# Patient Record
Sex: FEMALE | Employment: FULL TIME | URBAN - METROPOLITAN AREA
[De-identification: names, ages, dates, MRNs, and addresses within clinical notes are randomized per-mention and may not be internally consistent; named-entity substitution may affect disease eponyms.]

---

## 2017-06-08 ENCOUNTER — HOSPITAL ENCOUNTER (OUTPATIENT)
Dept: MAMMOGRAPHY | Age: 77
Discharge: HOME | End: 2017-06-08
Attending: SURGERY | Admitting: SURGERY

## 2017-06-08 DIAGNOSIS — Z12.31 VISIT FOR SCREENING MAMMOGRAM: ICD-10-CM

## 2018-06-14 ENCOUNTER — HOSPITAL ENCOUNTER (OUTPATIENT)
Dept: MAMMOGRAPHY | Age: 78
Discharge: HOME | End: 2018-06-14
Attending: SURGERY | Admitting: SURGERY

## 2018-06-14 DIAGNOSIS — Z12.31 VISIT FOR SCREENING MAMMOGRAM: ICD-10-CM

## 2019-06-20 ENCOUNTER — HOSPITAL ENCOUNTER (OUTPATIENT)
Dept: MAMMOGRAPHY | Age: 79
Discharge: HOME | End: 2019-06-20
Payer: MEDICARE

## 2019-06-20 DIAGNOSIS — Z12.31 VISIT FOR SCREENING MAMMOGRAM: ICD-10-CM

## 2019-06-20 PROCEDURE — 77067 SCR MAMMO BI INCL CAD: CPT

## 2020-08-13 ENCOUNTER — HOSPITAL ENCOUNTER (OUTPATIENT)
Dept: MAMMOGRAPHY | Age: 80
Discharge: HOME | End: 2020-08-13
Payer: MEDICARE

## 2020-08-13 PROCEDURE — 77067 SCR MAMMO BI INCL CAD: CPT

## 2023-03-09 ENCOUNTER — HOSPITAL ENCOUNTER (OUTPATIENT)
Dept: ULTRASOUND IMAGING | Age: 83
Discharge: HOME | End: 2023-03-09
Payer: MEDICARE

## 2023-03-09 ENCOUNTER — HOSPITAL ENCOUNTER (OUTPATIENT)
Dept: MAMMOGRAPHY | Age: 83
Discharge: HOME | End: 2023-03-09
Payer: MEDICARE

## 2023-03-09 VITALS — BODY MASS INDEX: 2.94 KG/M2 | HEIGHT: 58 IN | WEIGHT: 14 LBS

## 2023-03-09 DIAGNOSIS — C50.011 PAGET'S DISEASE OF THE BREAST, RIGHT (HCC): ICD-10-CM

## 2023-03-09 DIAGNOSIS — N61.0 INFLAMMATORY DISEASE OF BREAST: ICD-10-CM

## 2023-03-09 DIAGNOSIS — N63.10 MASS OF RIGHT BREAST, UNSPECIFIED QUADRANT: ICD-10-CM

## 2023-03-09 PROCEDURE — 77066 DX MAMMO INCL CAD BI: CPT

## 2023-03-09 PROCEDURE — 76642 ULTRASOUND BREAST LIMITED: CPT

## 2024-04-25 ENCOUNTER — APPOINTMENT (OUTPATIENT)
Age: 84
DRG: 308 | End: 2024-04-25
Payer: MEDICARE

## 2024-04-25 ENCOUNTER — HOSPITAL ENCOUNTER (INPATIENT)
Age: 84
LOS: 5 days | Discharge: NURSING, REHAB OR LONG-TERM CARE | DRG: 308 | End: 2024-04-30
Attending: EMERGENCY MEDICINE | Admitting: INTERNAL MEDICINE
Payer: MEDICARE

## 2024-04-25 DIAGNOSIS — R74.8 ELEVATED CK: ICD-10-CM

## 2024-04-25 DIAGNOSIS — R29.6 FREQUENT FALLS: ICD-10-CM

## 2024-04-25 DIAGNOSIS — R79.89 ELEVATED TROPONIN: ICD-10-CM

## 2024-04-25 DIAGNOSIS — I48.91 ATRIAL FIBRILLATION, UNSPECIFIED TYPE (HCC): ICD-10-CM

## 2024-04-25 DIAGNOSIS — N39.0 ACUTE LOWER UTI: ICD-10-CM

## 2024-04-25 DIAGNOSIS — R53.1 GENERAL WEAKNESS: Primary | ICD-10-CM
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
## 2024-04-25 LAB
ALBUMIN SERPL-MCNC: 3.6 G/DL (ref 3.4–5)
ALBUMIN/GLOB SERPL: 1.2 {RATIO}
ALP SERPL-CCNC: 82 U/L (ref 40–129)
ALT SERPL-CCNC: 6 U/L (ref 10–40)
ANION GAP SERPL CALCULATED.3IONS-SCNC: 14 MMOL/L (ref 3–16)
AST SERPL-CCNC: 43 U/L (ref 15–37)
BACTERIA URNS QL MICRO: ABNORMAL
BASOPHILS # BLD: 0.01 K/UL (ref 0–0.2)
BASOPHILS NFR BLD: 0 %
BILIRUB SERPL-MCNC: 1 MG/DL (ref 0–1)
BILIRUB UR QL STRIP: ABNORMAL
BUN SERPL-MCNC: 24 MG/DL (ref 7–20)
CALCIUM SERPL-MCNC: 9.9 MG/DL (ref 8.3–10.6)
CHARACTER UR: ABNORMAL
CHLORIDE SERPL-SCNC: 99 MMOL/L (ref 99–110)
CK SERPL-CCNC: 374 U/L (ref 26–192)
CLARITY UR: ABNORMAL
CO2 SERPL-SCNC: 27 MMOL/L (ref 21–32)
COLOR UR: YELLOW
CREAT SERPL-MCNC: 0.9 MG/DL (ref 0.6–1.2)
EOSINOPHIL # BLD: 0.02 K/UL (ref 0–0.6)
EOSINOPHILS RELATIVE PERCENT: 0 %
EPI CELLS #/AREA URNS HPF: ABNORMAL /HPF
ERYTHROCYTE [DISTWIDTH] IN BLOOD BY AUTOMATED COUNT: 15 % (ref 12.4–15.4)
FLUAV AG SPEC QL: NEGATIVE
FLUBV AG SPEC QL: NEGATIVE
GFR SERPL CREATININE-BSD FRML MDRD: 61 ML/MIN/1.73M2
GLUCOSE SERPL-MCNC: 84 MG/DL (ref 70–99)
GLUCOSE UR STRIP-MCNC: NEGATIVE MG/DL
HCT VFR BLD AUTO: 48.3 % (ref 36–48)
HGB BLD-MCNC: 15.6 G/DL (ref 12–16)
HGB UR QL STRIP.AUTO: ABNORMAL
IMM GRANULOCYTES # BLD AUTO: 0.08 K/UL (ref 0–0.5)
IMM GRANULOCYTES NFR BLD: 1 %
KETONES UR STRIP-MCNC: 15 MG/DL
LEUKOCYTE ESTERASE UR QL STRIP: ABNORMAL
LYMPHOCYTES NFR BLD: 0.71 K/UL (ref 1–5.1)
LYMPHOCYTES RELATIVE PERCENT: 5 %
MCH RBC QN AUTO: 30.8 PG (ref 26–34)
MCHC RBC AUTO-ENTMCNC: 32.3 G/DL (ref 31–36)
MCV RBC AUTO: 95.3 FL (ref 80–100)
MONOCYTES NFR BLD: 0.75 K/UL (ref 0–1.3)
MONOCYTES NFR BLD: 6 %
NEUTROPHILS NFR BLD: 89 %
NEUTS SEG NFR BLD: 12.06 K/UL (ref 1.7–7.7)
NITRITE UR QL STRIP: NEGATIVE
PH UR STRIP: 6 [PH] (ref 5–8)
PLATELET # BLD AUTO: 170 K/UL (ref 135–450)
PMV BLD AUTO: 11.5 FL (ref 9.4–12.4)
POTASSIUM SERPL-SCNC: 3.6 MMOL/L (ref 3.5–5.1)
PROT SERPL-MCNC: 6.6 G/DL (ref 6.4–8.2)
PROT UR STRIP-MCNC: 30 MG/DL
RBC # BLD AUTO: 5.07 M/UL (ref 4–5.2)
RBC #/AREA URNS HPF: ABNORMAL /HPF
SARS-COV-2 RDRP RESP QL NAA+PROBE: NOT DETECTED
SODIUM SERPL-SCNC: 139 MMOL/L (ref 136–145)
SP GR UR STRIP: 1.02 (ref 1–1.03)
SPECIMEN DESCRIPTION: NORMAL
TROPONIN I SERPL HS-MCNC: 34 NG/L (ref 0–14)
TROPONIN I SERPL HS-MCNC: 36 NG/L (ref 0–14)
UROBILINOGEN UR STRIP-ACNC: 2 EU/DL (ref 0–1)
WBC #/AREA URNS HPF: ABNORMAL /HPF
WBC OTHER # BLD: 13.6 K/UL (ref 4–11)

## 2024-04-25 PROCEDURE — 99285 EMERGENCY DEPT VISIT HI MDM: CPT

## 2024-04-25 PROCEDURE — 2580000003 HC RX 258: Performed by: INTERNAL MEDICINE

## 2024-04-25 PROCEDURE — 84484 ASSAY OF TROPONIN QUANT: CPT

## 2024-04-25 PROCEDURE — 93005 ELECTROCARDIOGRAM TRACING: CPT

## 2024-04-25 PROCEDURE — 85025 COMPLETE CBC W/AUTO DIFF WBC: CPT

## 2024-04-25 PROCEDURE — 2580000003 HC RX 258: Performed by: EMERGENCY MEDICINE

## 2024-04-25 PROCEDURE — 73502 X-RAY EXAM HIP UNI 2-3 VIEWS: CPT

## 2024-04-25 PROCEDURE — 87635 SARS-COV-2 COVID-19 AMP PRB: CPT

## 2024-04-25 PROCEDURE — 1200000000 HC SEMI PRIVATE

## 2024-04-25 PROCEDURE — 71045 X-RAY EXAM CHEST 1 VIEW: CPT

## 2024-04-25 PROCEDURE — 6370000000 HC RX 637 (ALT 250 FOR IP): Performed by: INTERNAL MEDICINE

## 2024-04-25 PROCEDURE — 87804 INFLUENZA ASSAY W/OPTIC: CPT

## 2024-04-25 PROCEDURE — 81001 URINALYSIS AUTO W/SCOPE: CPT

## 2024-04-25 PROCEDURE — 80053 COMPREHEN METABOLIC PANEL: CPT

## 2024-04-25 PROCEDURE — 96360 HYDRATION IV INFUSION INIT: CPT

## 2024-04-25 PROCEDURE — 70450 CT HEAD/BRAIN W/O DYE: CPT

## 2024-04-25 PROCEDURE — 82550 ASSAY OF CK (CPK): CPT

## 2024-04-25 PROCEDURE — 87086 URINE CULTURE/COLONY COUNT: CPT

## 2024-04-25 PROCEDURE — 94761 N-INVAS EAR/PLS OXIMETRY MLT: CPT

## 2024-04-25 PROCEDURE — 6360000002 HC RX W HCPCS: Performed by: EMERGENCY MEDICINE

## 2024-04-25 PROCEDURE — 36415 COLL VENOUS BLD VENIPUNCTURE: CPT

## 2024-04-25 PROCEDURE — 2700000000 HC OXYGEN THERAPY PER DAY

## 2024-04-25 RX ORDER — PROPRANOLOL HCL 60 MG
120 CAPSULE, EXTENDED RELEASE 24HR ORAL DAILY
Status: DISCONTINUED | OUTPATIENT
Start: 2024-04-26 | End: 2024-04-29

## 2024-04-25 RX ORDER — HEPARIN SODIUM 1000 [USP'U]/ML
30 INJECTION, SOLUTION INTRAVENOUS; SUBCUTANEOUS PRN
Status: DISCONTINUED | OUTPATIENT
Start: 2024-04-25 | End: 2024-04-25

## 2024-04-25 RX ORDER — ACETAMINOPHEN 650 MG/1
650 SUPPOSITORY RECTAL EVERY 6 HOURS PRN
Status: DISCONTINUED | OUTPATIENT
Start: 2024-04-25 | End: 2024-04-30 | Stop reason: HOSPADM

## 2024-04-25 RX ORDER — POTASSIUM CHLORIDE 20 MEQ/1
40 TABLET, EXTENDED RELEASE ORAL PRN
Status: DISCONTINUED | OUTPATIENT
Start: 2024-04-25 | End: 2024-04-30 | Stop reason: HOSPADM

## 2024-04-25 RX ORDER — SODIUM CHLORIDE 0.9 % (FLUSH) 0.9 %
5-40 SYRINGE (ML) INJECTION PRN
Status: DISCONTINUED | OUTPATIENT
Start: 2024-04-25 | End: 2024-04-27 | Stop reason: SDUPTHER

## 2024-04-25 RX ORDER — MAGNESIUM SULFATE IN WATER 40 MG/ML
2000 INJECTION, SOLUTION INTRAVENOUS PRN
Status: DISCONTINUED | OUTPATIENT
Start: 2024-04-25 | End: 2024-04-30 | Stop reason: HOSPADM

## 2024-04-25 RX ORDER — ASPIRIN 81 MG/1
81 TABLET ORAL DAILY
Status: DISCONTINUED | OUTPATIENT
Start: 2024-04-26 | End: 2024-04-30 | Stop reason: HOSPADM

## 2024-04-25 RX ORDER — POLYETHYLENE GLYCOL 3350 17 G/17G
17 POWDER, FOR SOLUTION ORAL DAILY PRN
Status: DISCONTINUED | OUTPATIENT
Start: 2024-04-25 | End: 2024-04-30 | Stop reason: HOSPADM

## 2024-04-25 RX ORDER — 0.9 % SODIUM CHLORIDE 0.9 %
500 INTRAVENOUS SOLUTION INTRAVENOUS ONCE
Status: COMPLETED | OUTPATIENT
Start: 2024-04-25 | End: 2024-04-25

## 2024-04-25 RX ORDER — HEPARIN SODIUM 1000 [USP'U]/ML
60 INJECTION, SOLUTION INTRAVENOUS; SUBCUTANEOUS PRN
Status: DISCONTINUED | OUTPATIENT
Start: 2024-04-25 | End: 2024-04-25

## 2024-04-25 RX ORDER — ACETAMINOPHEN 325 MG/1
650 TABLET ORAL EVERY 6 HOURS PRN
Status: DISCONTINUED | OUTPATIENT
Start: 2024-04-25 | End: 2024-04-30 | Stop reason: HOSPADM

## 2024-04-25 RX ORDER — ONDANSETRON 2 MG/ML
4 INJECTION INTRAMUSCULAR; INTRAVENOUS EVERY 6 HOURS PRN
Status: DISCONTINUED | OUTPATIENT
Start: 2024-04-25 | End: 2024-04-30 | Stop reason: HOSPADM

## 2024-04-25 RX ORDER — SODIUM CHLORIDE 0.9 % (FLUSH) 0.9 %
5-40 SYRINGE (ML) INJECTION EVERY 12 HOURS SCHEDULED
Status: DISCONTINUED | OUTPATIENT
Start: 2024-04-25 | End: 2024-04-27 | Stop reason: SDUPTHER

## 2024-04-25 RX ORDER — HEPARIN SODIUM 1000 [USP'U]/ML
60 INJECTION, SOLUTION INTRAVENOUS; SUBCUTANEOUS ONCE
Status: DISCONTINUED | OUTPATIENT
Start: 2024-04-25 | End: 2024-04-25

## 2024-04-25 RX ORDER — ONDANSETRON 4 MG/1
4 TABLET, ORALLY DISINTEGRATING ORAL EVERY 8 HOURS PRN
Status: DISCONTINUED | OUTPATIENT
Start: 2024-04-25 | End: 2024-04-30 | Stop reason: HOSPADM

## 2024-04-25 RX ORDER — HEPARIN SODIUM 10000 [USP'U]/100ML
5-30 INJECTION, SOLUTION INTRAVENOUS CONTINUOUS
Status: DISCONTINUED | OUTPATIENT
Start: 2024-04-25 | End: 2024-04-25

## 2024-04-25 RX ORDER — PANTOPRAZOLE SODIUM 40 MG/1
40 TABLET, DELAYED RELEASE ORAL
Status: DISCONTINUED | OUTPATIENT
Start: 2024-04-26 | End: 2024-04-30 | Stop reason: HOSPADM

## 2024-04-25 RX ORDER — SODIUM CHLORIDE 9 MG/ML
INJECTION, SOLUTION INTRAVENOUS PRN
Status: DISCONTINUED | OUTPATIENT
Start: 2024-04-25 | End: 2024-04-30 | Stop reason: HOSPADM

## 2024-04-25 RX ORDER — SODIUM CHLORIDE 9 MG/ML
INJECTION, SOLUTION INTRAVENOUS CONTINUOUS
Status: DISCONTINUED | OUTPATIENT
Start: 2024-04-25 | End: 2024-04-26

## 2024-04-25 RX ORDER — LOSARTAN POTASSIUM 25 MG/1
50 TABLET ORAL DAILY
Status: DISCONTINUED | OUTPATIENT
Start: 2024-04-26 | End: 2024-04-29

## 2024-04-25 RX ORDER — ATORVASTATIN CALCIUM 10 MG/1
40 TABLET, FILM COATED ORAL NIGHTLY
Status: DISCONTINUED | OUTPATIENT
Start: 2024-04-25 | End: 2024-04-30 | Stop reason: HOSPADM

## 2024-04-25 RX ORDER — POTASSIUM CHLORIDE 7.45 MG/ML
10 INJECTION INTRAVENOUS PRN
Status: DISCONTINUED | OUTPATIENT
Start: 2024-04-25 | End: 2024-04-30 | Stop reason: HOSPADM

## 2024-04-25 RX ADMIN — SODIUM CHLORIDE 500 ML: 9 INJECTION, SOLUTION INTRAVENOUS at 16:31

## 2024-04-25 RX ADMIN — WATER 1000 MG: 1 INJECTION INTRAMUSCULAR; INTRAVENOUS; SUBCUTANEOUS at 17:55

## 2024-04-25 RX ADMIN — Medication 10 ML: at 20:14

## 2024-04-25 RX ADMIN — SODIUM CHLORIDE: 9 INJECTION, SOLUTION INTRAVENOUS at 20:13

## 2024-04-25 RX ADMIN — ATORVASTATIN CALCIUM 40 MG: 40 TABLET, FILM COATED ORAL at 20:09

## 2024-04-25 ASSESSMENT — LIFESTYLE VARIABLES
HOW MANY STANDARD DRINKS CONTAINING ALCOHOL DO YOU HAVE ON A TYPICAL DAY: PATIENT DOES NOT DRINK
HOW OFTEN DO YOU HAVE A DRINK CONTAINING ALCOHOL: NEVER

## 2024-04-25 ASSESSMENT — PAIN - FUNCTIONAL ASSESSMENT: PAIN_FUNCTIONAL_ASSESSMENT: 0-10

## 2024-04-25 NOTE — ACP (ADVANCE CARE PLANNING)
Advance Care Planning     Advance Care Planning Activator (Inpatient)  Conversation Note      Date of ACP Conversation: 4/25/2024   Conversation Conducted with: Patient with Decision Making Capacity  ACP Activator: Neil Rome MD    Hospital diagnoses warranting ACP:  Principal Problem:    Fall at home, initial encounter  Resolved Problems:    * No resolved hospital problems. *        Health Care Decision Maker:   Current Designated Health Care Decision Maker: Kiran Daley (Son)        Care Preferences    Ventilation:  \"If you were in your present state of health and suddenly became very ill and were unable to breathe on your own, what would your preference be about the use of a ventilator (breathing machine) if it were available to you?\"      Would the patient desire the use of ventilator (breathing machine)?: no    \"If your health worsens and it becomes clear that your chance of recovery is unlikely, what would your preference be about the use of a ventilator (breathing machine) if it were available to you?\"     Would the patient desire the use of ventilator (breathing machine)?: No      Resuscitation  \"CPR works best to restart the heart when there is a sudden event, like a heart attack, in someone who is otherwise healthy. Unfortunately, CPR does not typically restart the heart for people who have serious health conditions or who are very sick.\"    \"In the event your heart stopped as a result of an underlying serious health condition, would you want attempts to be made to restart your heart (answer \"yes\" for attempt to resuscitate) or would you prefer a natural death (answer \"no\" for do not attempt to resuscitate)?\" no       [x] Yes   [] No   Educated Patient / Decision Maker regarding differences between Advance Directives and portable DNR orders.        Conversation Outcomes:  [x] ACP discussion completed  [] Existing advance directive reviewed with patient; no changes to patient's previously recorded

## 2024-04-25 NOTE — H&P
CCA  Healthcare power of -Vidal Daley (SON)    Diet Regular 2 g sodium   DVT Prophylaxis [] Lovenox, [x]  Heparin, [] SCDs, [] Ambulation   GI Prophylaxis [] PPI,  [] H2 Blocker,  [] Carafate,  [] Diet/Tube Feeds   Code Status DNR-CCA   Disposition Patient requires continued admission due to UTI/atrial fibrillation/cardiology workup   MDM [] Low, [] Moderate,[x]  High  Patient's risk as above      History of Present Illness:     Chief Complaint: Fall at home, initial encounter  Radha Daley is a 83 y.o.  female with past medical history of hypertension, hyperlipidemia, came to the emergency room complaining of generalized weakness.  Patient apparently was not able to get up from the toilet seat and gradually slid herself down and was unable to get up from the floor.  Patient apparently laid on the floor for about 4 hours.  Denies any dizziness or loss of consciousness.  Reports episodes of falls recently.  Reports right hip pain.  Denies any fever, chills, cough, shortness of breath, chest pain, abdominal pain, nausea, vomiting, diarrhea or constipation.  Patient reports that she has become progressively weak in recent days.    Objective:     Intake/Output Summary (Last 24 hours) at 4/25/2024 1755  Last data filed at 4/25/2024 1702  Gross per 24 hour   Intake 499.98 ml   Output --   Net 499.98 ml      Vitals:   Vitals:    04/25/24 1659   BP: 126/89   Pulse: 72   Resp: (!) 33   Temp:    SpO2: 98%     Physical Exam:     General-no acute distress  CVS-S1-S2 RRR  Respiratory-bilateral air entry fair  Abdomen-soft, nontender, nondistended  CNS-AOx3, no focal deficits    Past Medical History:      Past Medical History:   Diagnosis Date    Breast cancer (HCC)     History of therapeutic radiation      PSHX:  has no past surgical history on file.    Allergies: No Known Allergies    FAM HX: family history includes Breast Cancer in her mother, niece, and sister.  Soc HX:   Social History     Socioeconomic

## 2024-04-25 NOTE — ED TRIAGE NOTES
Patient presents to ED with concern for weakness. Reportedly was on the toilet, was unable to get up, slid herself to the floor to attempt to get assistance. Lives at home alone, denies any complaint or injury.

## 2024-04-25 NOTE — ED NOTES
Daughter updated on pt status.  Sister and brother in law at bedside  
EKG performed & given to MD for review.   
Hospitalist at [facility]  
Pt assisted to bedside commode with 2 nurses. Pt posture hunched over. Pt appears to not be taken care of hygiene wise. Assisted back to bed with heavy assistance and repositioned. SPO2 dropped to mid 80's with 2L on.   
Pt repositioned in bed and pulse ox changed, SPO2 remain in 80's. 2L NC applied  
Pt to CT per bed  
X-ray at bedside.   
  Peripheral IV 04/25/24 Left Antecubital (Active)   Site Assessment Clean, dry & intact 04/25/24 1625   Line Status Flushed 04/25/24 1625   Phlebitis Assessment No symptoms 04/25/24 1625   Infiltration Assessment 0 04/25/24 1625   Dressing Status New dressing applied 04/25/24 1625   Dressing Type Transparent 04/25/24 1625   Dressing Intervention New 04/25/24 1625     PO Status: Regular  Pertinent or High Risk Medications/Drips: no   If Yes, please provide details:     Pending Blood Product Administration: no       You may also review the ED PT Care Timeline found under the Summary Nursing Index tab.    Recommendation    Pending orders     Plan for Discharge (if known):   Additional Comments:      If any further questions, please call Sending RN at [phone]    Electronically signed by: Electronically signed by Nisreen Wood RN on 4/25/2024 at 6:15 PM

## 2024-04-25 NOTE — ED PROVIDER NOTES
personally seen and examined this patient. I have fully participated in the care of this patient and I have reviewed and agree with all pertinent clinical information including history, physical exam, and plan. I have also reviewed and agree with the medications, allergies and past medical history section for this patient.    Electronically signed by: Yuri Lombardo Jr., , JANIS, [facility], 4/25/2024  5:47 PM        Yuri Lombardo DO  04/25/24 9322

## 2024-04-26 ENCOUNTER — APPOINTMENT (OUTPATIENT)
Age: 84
DRG: 308 | End: 2024-04-26
Payer: MEDICARE

## 2024-04-26 PROBLEM — E44.0 MODERATE MALNUTRITION (HCC): Chronic | Status: ACTIVE | Noted: 2024-04-26

## 2024-04-26 PROBLEM — I27.20 PULMONARY HYPERTENSION (HCC): Status: ACTIVE | Noted: 2024-04-26

## 2024-04-26 LAB
ANION GAP SERPL CALCULATED.3IONS-SCNC: 11 MMOL/L (ref 3–16)
ANTI-XA UNFRAC HEPARIN: <0.1 IU/L (ref 0.3–0.7)
BASOPHILS # BLD: 0.01 K/UL (ref 0–0.2)
BASOPHILS NFR BLD: 0 %
BUN SERPL-MCNC: 25 MG/DL (ref 7–20)
CALCIUM SERPL-MCNC: 8.4 MG/DL (ref 8.3–10.6)
CHLORIDE SERPL-SCNC: 103 MMOL/L (ref 99–110)
CK SERPL-CCNC: 295 U/L (ref 26–192)
CK SERPL-CCNC: 323 U/L (ref 26–192)
CO2 SERPL-SCNC: 27 MMOL/L (ref 21–32)
CREAT SERPL-MCNC: 0.9 MG/DL (ref 0.6–1.2)
D DIMER PPP FEU-MCNC: 3.24 UG/ML FEU (ref 0–0.6)
EKG DIAGNOSIS: NORMAL
EKG Q-T INTERVAL: 432 MS
EKG QRS DURATION: 74 MS
EKG QTC CALCULATION (BAZETT): 456 MS
EKG R AXIS: -68 DEGREES
EKG T AXIS: 180 DEGREES
EKG VENTRICULAR RATE: 67 BPM
EOSINOPHIL # BLD: 0.02 K/UL (ref 0–0.6)
EOSINOPHILS RELATIVE PERCENT: 0 %
ERYTHROCYTE [DISTWIDTH] IN BLOOD BY AUTOMATED COUNT: 15 % (ref 12.4–15.4)
GFR SERPL CREATININE-BSD FRML MDRD: 68 ML/MIN/1.73M2
GLUCOSE SERPL-MCNC: 79 MG/DL (ref 70–99)
HCT VFR BLD AUTO: 42.5 % (ref 36–48)
HGB BLD-MCNC: 13.7 G/DL (ref 12–16)
IMM GRANULOCYTES # BLD AUTO: 0.03 K/UL (ref 0–0.5)
IMM GRANULOCYTES NFR BLD: 0 %
INR PPP: 1.1 (ref 0.9–1.2)
LACTATE BLDV-SCNC: 3.6 MMOL/L (ref 0.4–2)
LYMPHOCYTES NFR BLD: 0.58 K/UL (ref 1–5.1)
LYMPHOCYTES RELATIVE PERCENT: 5 %
MAGNESIUM SERPL-MCNC: 1.9 MG/DL (ref 1.8–2.4)
MCH RBC QN AUTO: 31 PG (ref 26–34)
MCHC RBC AUTO-ENTMCNC: 32.2 G/DL (ref 31–36)
MCV RBC AUTO: 96.2 FL (ref 80–100)
MICROORGANISM SPEC CULT: NORMAL
MONOCYTES NFR BLD: 0.54 K/UL (ref 0–1.3)
MONOCYTES NFR BLD: 5 %
NEUTROPHILS NFR BLD: 90 %
NEUTS SEG NFR BLD: 10.38 K/UL (ref 1.7–7.7)
PARTIAL THROMBOPLASTIN TIME: 25.8 SEC (ref 22.1–36.4)
PLATELET # BLD AUTO: 156 K/UL (ref 135–450)
PMV BLD AUTO: 10.9 FL
POTASSIUM SERPL-SCNC: 3.5 MMOL/L (ref 3.5–5.1)
PROTHROMBIN TIME: 14.7 SEC (ref 11.9–14.9)
RBC # BLD AUTO: 4.42 M/UL (ref 4–5.2)
SODIUM SERPL-SCNC: 141 MMOL/L (ref 136–145)
SPECIMEN DESCRIPTION: NORMAL
TROPONIN I SERPL HS-MCNC: 44 NG/L (ref 0–14)
WBC OTHER # BLD: 11.6 K/UL (ref 4–11)

## 2024-04-26 PROCEDURE — 83735 ASSAY OF MAGNESIUM: CPT

## 2024-04-26 PROCEDURE — 94640 AIRWAY INHALATION TREATMENT: CPT

## 2024-04-26 PROCEDURE — 2700000000 HC OXYGEN THERAPY PER DAY

## 2024-04-26 PROCEDURE — 97110 THERAPEUTIC EXERCISES: CPT

## 2024-04-26 PROCEDURE — 97530 THERAPEUTIC ACTIVITIES: CPT

## 2024-04-26 PROCEDURE — 82550 ASSAY OF CK (CPK): CPT

## 2024-04-26 PROCEDURE — 97166 OT EVAL MOD COMPLEX 45 MIN: CPT

## 2024-04-26 PROCEDURE — 71260 CT THORAX DX C+: CPT

## 2024-04-26 PROCEDURE — 84484 ASSAY OF TROPONIN QUANT: CPT

## 2024-04-26 PROCEDURE — 80048 BASIC METABOLIC PNL TOTAL CA: CPT

## 2024-04-26 PROCEDURE — 6360000004 HC RX CONTRAST MEDICATION: Performed by: INTERNAL MEDICINE

## 2024-04-26 PROCEDURE — 93306 TTE W/DOPPLER COMPLETE: CPT

## 2024-04-26 PROCEDURE — 85730 THROMBOPLASTIN TIME PARTIAL: CPT

## 2024-04-26 PROCEDURE — 85379 FIBRIN DEGRADATION QUANT: CPT

## 2024-04-26 PROCEDURE — 6360000002 HC RX W HCPCS: Performed by: INTERNAL MEDICINE

## 2024-04-26 PROCEDURE — 74174 CTA ABD&PLVS W/CONTRAST: CPT

## 2024-04-26 PROCEDURE — 51702 INSERT TEMP BLADDER CATH: CPT

## 2024-04-26 PROCEDURE — 2580000003 HC RX 258: Performed by: INTERNAL MEDICINE

## 2024-04-26 PROCEDURE — 85610 PROTHROMBIN TIME: CPT

## 2024-04-26 PROCEDURE — 2000000000 HC ICU R&B

## 2024-04-26 PROCEDURE — 02HV33Z INSERTION OF INFUSION DEVICE INTO SUPERIOR VENA CAVA, PERCUTANEOUS APPROACH: ICD-10-PCS | Performed by: INTERNAL MEDICINE

## 2024-04-26 PROCEDURE — 85025 COMPLETE CBC W/AUTO DIFF WBC: CPT

## 2024-04-26 PROCEDURE — 6370000000 HC RX 637 (ALT 250 FOR IP): Performed by: INTERNAL MEDICINE

## 2024-04-26 PROCEDURE — 71045 X-RAY EXAM CHEST 1 VIEW: CPT

## 2024-04-26 PROCEDURE — 36556 INSERT NON-TUNNEL CV CATH: CPT

## 2024-04-26 PROCEDURE — 83605 ASSAY OF LACTIC ACID: CPT

## 2024-04-26 PROCEDURE — B543ZZA ULTRASONOGRAPHY OF RIGHT JUGULAR VEINS, GUIDANCE: ICD-10-PCS | Performed by: INTERNAL MEDICINE

## 2024-04-26 PROCEDURE — 85520 HEPARIN ASSAY: CPT

## 2024-04-26 PROCEDURE — 2500000003 HC RX 250 WO HCPCS: Performed by: INTERNAL MEDICINE

## 2024-04-26 PROCEDURE — 99222 1ST HOSP IP/OBS MODERATE 55: CPT | Performed by: INTERNAL MEDICINE

## 2024-04-26 PROCEDURE — 87040 BLOOD CULTURE FOR BACTERIA: CPT

## 2024-04-26 PROCEDURE — 93970 EXTREMITY STUDY: CPT

## 2024-04-26 PROCEDURE — 94761 N-INVAS EAR/PLS OXIMETRY MLT: CPT

## 2024-04-26 PROCEDURE — 36415 COLL VENOUS BLD VENIPUNCTURE: CPT

## 2024-04-26 PROCEDURE — 97162 PT EVAL MOD COMPLEX 30 MIN: CPT

## 2024-04-26 RX ORDER — NOREPINEPHRINE BITARTRATE 0.06 MG/ML
1-100 INJECTION, SOLUTION INTRAVENOUS CONTINUOUS
Status: DISCONTINUED | OUTPATIENT
Start: 2024-04-26 | End: 2024-04-29

## 2024-04-26 RX ORDER — SODIUM CHLORIDE 0.9 % (FLUSH) 0.9 %
5-40 SYRINGE (ML) INJECTION PRN
Status: DISCONTINUED | OUTPATIENT
Start: 2024-04-26 | End: 2024-04-30 | Stop reason: HOSPADM

## 2024-04-26 RX ORDER — SODIUM CHLORIDE 0.9 % (FLUSH) 0.9 %
5-40 SYRINGE (ML) INJECTION EVERY 12 HOURS SCHEDULED
Status: DISCONTINUED | OUTPATIENT
Start: 2024-04-26 | End: 2024-04-30 | Stop reason: HOSPADM

## 2024-04-26 RX ORDER — PROPRANOLOL HYDROCHLORIDE 120 MG/1
120 CAPSULE, EXTENDED RELEASE ORAL DAILY
Status: ON HOLD | COMMUNITY
Start: 2016-03-01 | End: 2024-04-30 | Stop reason: HOSPADM

## 2024-04-26 RX ORDER — 0.9 % SODIUM CHLORIDE 0.9 %
500 INTRAVENOUS SOLUTION INTRAVENOUS ONCE
Status: COMPLETED | OUTPATIENT
Start: 2024-04-26 | End: 2024-04-26

## 2024-04-26 RX ORDER — HEPARIN SODIUM 1000 [USP'U]/ML
80 INJECTION, SOLUTION INTRAVENOUS; SUBCUTANEOUS ONCE
Status: COMPLETED | OUTPATIENT
Start: 2024-04-26 | End: 2024-04-26

## 2024-04-26 RX ORDER — SODIUM CHLORIDE, SODIUM LACTATE, POTASSIUM CHLORIDE, AND CALCIUM CHLORIDE .6; .31; .03; .02 G/100ML; G/100ML; G/100ML; G/100ML
2000 INJECTION, SOLUTION INTRAVENOUS ONCE
Status: COMPLETED | OUTPATIENT
Start: 2024-04-26 | End: 2024-04-26

## 2024-04-26 RX ORDER — HEPARIN SODIUM 1000 [USP'U]/ML
40 INJECTION, SOLUTION INTRAVENOUS; SUBCUTANEOUS PRN
Status: DISCONTINUED | OUTPATIENT
Start: 2024-04-26 | End: 2024-04-29

## 2024-04-26 RX ORDER — IPRATROPIUM BROMIDE AND ALBUTEROL SULFATE 2.5; .5 MG/3ML; MG/3ML
1 SOLUTION RESPIRATORY (INHALATION)
Status: DISCONTINUED | OUTPATIENT
Start: 2024-04-26 | End: 2024-04-28

## 2024-04-26 RX ORDER — ENOXAPARIN SODIUM 100 MG/ML
40 INJECTION SUBCUTANEOUS DAILY
Status: DISCONTINUED | OUTPATIENT
Start: 2024-04-26 | End: 2024-04-26

## 2024-04-26 RX ORDER — VALSARTAN 160 MG/1
160 TABLET ORAL DAILY
Status: ON HOLD | COMMUNITY
Start: 2024-03-19 | End: 2024-04-30 | Stop reason: HOSPADM

## 2024-04-26 RX ORDER — PRAVASTATIN SODIUM 40 MG
40 TABLET ORAL DAILY
COMMUNITY

## 2024-04-26 RX ORDER — SODIUM CHLORIDE 9 MG/ML
25 INJECTION, SOLUTION INTRAVENOUS PRN
Status: DISCONTINUED | OUTPATIENT
Start: 2024-04-26 | End: 2024-04-30 | Stop reason: HOSPADM

## 2024-04-26 RX ORDER — LIDOCAINE HYDROCHLORIDE 10 MG/ML
5 INJECTION, SOLUTION EPIDURAL; INFILTRATION; INTRACAUDAL; PERINEURAL ONCE
Status: DISCONTINUED | OUTPATIENT
Start: 2024-04-26 | End: 2024-04-29

## 2024-04-26 RX ORDER — HEPARIN SODIUM 1000 [USP'U]/ML
80 INJECTION, SOLUTION INTRAVENOUS; SUBCUTANEOUS PRN
Status: DISCONTINUED | OUTPATIENT
Start: 2024-04-26 | End: 2024-04-29

## 2024-04-26 RX ORDER — HEPARIN SODIUM 10000 [USP'U]/100ML
5-30 INJECTION, SOLUTION INTRAVENOUS CONTINUOUS
Status: DISCONTINUED | OUTPATIENT
Start: 2024-04-26 | End: 2024-04-29

## 2024-04-26 RX ADMIN — HEPARIN SODIUM 18 UNITS/KG/HR: 10000 INJECTION, SOLUTION INTRAVENOUS at 16:15

## 2024-04-26 RX ADMIN — IPRATROPIUM BROMIDE AND ALBUTEROL SULFATE 1 DOSE: 2.5; .5 SOLUTION RESPIRATORY (INHALATION) at 16:17

## 2024-04-26 RX ADMIN — CEFTRIAXONE SODIUM 1000 MG: 1 INJECTION, POWDER, FOR SOLUTION INTRAMUSCULAR; INTRAVENOUS at 16:43

## 2024-04-26 RX ADMIN — IOPAMIDOL 75 ML: 755 INJECTION, SOLUTION INTRAVENOUS at 15:55

## 2024-04-26 RX ADMIN — ACETAMINOPHEN 650 MG: 325 TABLET ORAL at 17:59

## 2024-04-26 RX ADMIN — IPRATROPIUM BROMIDE AND ALBUTEROL SULFATE 1 DOSE: 2.5; .5 SOLUTION RESPIRATORY (INHALATION) at 20:39

## 2024-04-26 RX ADMIN — NOREPINEPHRINE BITARTRATE 5 MCG/MIN: 0.06 INJECTION, SOLUTION INTRAVENOUS at 21:03

## 2024-04-26 RX ADMIN — PIPERACILLIN AND TAZOBACTAM 3375 MG: 3; .375 INJECTION, POWDER, LYOPHILIZED, FOR SOLUTION INTRAVENOUS at 20:20

## 2024-04-26 RX ADMIN — PROPRANOLOL HYDROCHLORIDE 120 MG: 60 CAPSULE, EXTENDED RELEASE ORAL at 09:15

## 2024-04-26 RX ADMIN — Medication 10 ML: at 20:56

## 2024-04-26 RX ADMIN — IOPAMIDOL 75 ML: 755 INJECTION, SOLUTION INTRAVENOUS at 13:48

## 2024-04-26 RX ADMIN — ASPIRIN 81 MG: 81 TABLET, COATED ORAL at 09:15

## 2024-04-26 RX ADMIN — SODIUM CHLORIDE: 9 INJECTION, SOLUTION INTRAVENOUS at 20:18

## 2024-04-26 RX ADMIN — ONDANSETRON 4 MG: 4 TABLET, ORALLY DISINTEGRATING ORAL at 03:45

## 2024-04-26 RX ADMIN — SODIUM CHLORIDE: 9 INJECTION, SOLUTION INTRAVENOUS at 05:07

## 2024-04-26 RX ADMIN — Medication 10 ML: at 09:15

## 2024-04-26 RX ADMIN — LOSARTAN POTASSIUM 50 MG: 50 TABLET, FILM COATED ORAL at 09:15

## 2024-04-26 RX ADMIN — SODIUM CHLORIDE, POTASSIUM CHLORIDE, SODIUM LACTATE AND CALCIUM CHLORIDE 2000 ML: 600; 310; 30; 20 INJECTION, SOLUTION INTRAVENOUS at 20:46

## 2024-04-26 RX ADMIN — HEPARIN SODIUM 5100 UNITS: 1000 INJECTION INTRAVENOUS; SUBCUTANEOUS at 16:15

## 2024-04-26 RX ADMIN — SODIUM CHLORIDE 500 ML: 9 INJECTION, SOLUTION INTRAVENOUS at 19:43

## 2024-04-26 RX ADMIN — PANTOPRAZOLE SODIUM 40 MG: 40 TABLET, DELAYED RELEASE ORAL at 05:08

## 2024-04-26 ASSESSMENT — PAIN SCALES - GENERAL
PAINLEVEL_OUTOF10: 3
PAINLEVEL_OUTOF10: 0

## 2024-04-26 ASSESSMENT — PAIN DESCRIPTION - FREQUENCY: FREQUENCY: INTERMITTENT

## 2024-04-26 ASSESSMENT — PAIN DESCRIPTION - LOCATION: LOCATION: HEAD

## 2024-04-26 ASSESSMENT — PAIN DESCRIPTION - DESCRIPTORS: DESCRIPTORS: ACHING

## 2024-04-26 ASSESSMENT — PAIN DESCRIPTION - PAIN TYPE: TYPE: ACUTE PAIN

## 2024-04-26 ASSESSMENT — PAIN SCALES - WONG BAKER: WONGBAKER_NUMERICALRESPONSE: NO HURT

## 2024-04-26 ASSESSMENT — PAIN - FUNCTIONAL ASSESSMENT: PAIN_FUNCTIONAL_ASSESSMENT: ACTIVITIES ARE NOT PREVENTED

## 2024-04-26 ASSESSMENT — PAIN DESCRIPTION - ORIENTATION: ORIENTATION: MID

## 2024-04-26 ASSESSMENT — PAIN DESCRIPTION - ONSET: ONSET: GRADUAL

## 2024-04-26 NOTE — PLAN OF CARE
Problem: ABCDS Injury Assessment  Goal: Absence of physical injury  4/26/2024 1012 by Ysabel Pérez, RN  Outcome: Progressing        Problem: Safety - Adult  Goal: Free from fall injury  4/26/2024 1011 by Ysabel Pérez, RN  Outcome: Progressing  Patient free from fall injury during morning evluation

## 2024-04-26 NOTE — CARE COORDINATION
Case Management Assessment  Initial Evaluation    Date/Time of Evaluation: 4/26/2024 9:49 AM  Assessment Completed by: Gifty Cisse    If patient is discharged prior to next notation, then this note serves as note for discharge by case management.    Patient Name: Radha Daley                   YOB: 1940  Diagnosis: General weakness [R53.1]  Elevated troponin [R79.89]  Elevated CK [R74.8]  Acute lower UTI [N39.0]  Frequent falls [R29.6]  Fall at home, initial encounter [W19.XXXA, Y92.009]  Atrial fibrillation, unspecified type (HCC) [I48.91]                   Date / Time: 4/25/2024  3:52 PM    Patient Admission Status: Inpatient   Readmission Risk (Low < 19, Mod (19-27), High > 27): Readmission Risk Score: 10.9    Current PCP: Nisreen Tong MD  PCP verified by CM? Yes    Chart Reviewed: Yes      History Provided by: Patient  Patient Orientation: Alert and Oriented    Patient Cognition: Alert    Hospitalization in the last 30 days (Readmission):  No    If yes, Readmission Assessment in CM Navigator will be completed.    Advance Directives:      Code Status: DNR-CCA   Patient's Primary Decision Maker is: Legal Next of Kin      Discharge Planning:    Patient lives with: Alone Type of Home: House  Primary Care Giver: Self  Patient Support Systems include: Children, Family Members   Current Financial resources: Medicare  Current community resources: None  Current services prior to admission: None            Current DME:              Type of Home Care services:  OT, PT    ADLS  Prior functional level: Independent in ADLs/IADLs  Current functional level: Independent in ADLs/IADLs    PT AM-PAC:   /24  OT AM-PAC:   /24    Family can provide assistance at DC: No  Would you like Case Management to discuss the discharge plan with any other family members/significant others, and if so, who? No  Plans to Return to Present Housing: Unknown at present  Other Identified Issues/Barriers to

## 2024-04-26 NOTE — CARE COORDINATION
Precert request submitted by [facility] 4/26   CM will follow up with approval status.

## 2024-04-26 NOTE — CARE COORDINATION
TJ spoke with son Vidal who lives in [place] to update him on discharge plan to [facility].    Vidal stated he may need some help with arranging some senior services for his mother.      TJ consulted Sharona with [facility] [phone] for additional needs following discharge, she will follow up with pt son Vidal.

## 2024-04-26 NOTE — CARE COORDINATION
Discharge Planning Note Re: Skilled Nursing     CM/SW noted consult for discharge planning. Chart reviewed. Noted recommendations for SNF.  CM met with patient. Introduced self and explained role of CM and discharge planning.    Patient is agreeable to SNF on dc.     [place] of choice list was provided with basic dialogue that supports the patient's individualized plan of care/goals, treatment preferences and shares the quality data associated with the providers. [x] Yes [] No.  Patient has reviewed the provided list and made selections.    Referral made to [facility].  Erika is contact [phone]     CM/SW will follow-up on referrals and provide any additional documentation necessary to facilitate placement.

## 2024-04-27 ENCOUNTER — APPOINTMENT (OUTPATIENT)
Age: 84
DRG: 308 | End: 2024-04-27
Payer: MEDICARE

## 2024-04-27 PROBLEM — R53.1 GENERAL WEAKNESS: Status: ACTIVE | Noted: 2024-04-27

## 2024-04-27 LAB
ALBUMIN SERPL-MCNC: 2.7 G/DL (ref 3.4–5)
ALBUMIN/GLOB SERPL: 1.2 {RATIO}
ALP SERPL-CCNC: 61 U/L (ref 40–129)
ALT SERPL-CCNC: 16 U/L (ref 10–40)
ANION GAP SERPL CALCULATED.3IONS-SCNC: 7 MMOL/L (ref 3–16)
ANTI-XA UNFRAC HEPARIN: 0.59 IU/L (ref 0.3–0.7)
ANTI-XA UNFRAC HEPARIN: 0.74 IU/L (ref 0.3–0.7)
ANTI-XA UNFRAC HEPARIN: 0.76 IU/L (ref 0.3–0.7)
ANTI-XA UNFRAC HEPARIN: 0.88 IU/L (ref 0.3–0.7)
AST SERPL-CCNC: 40 U/L (ref 15–37)
BASOPHILS # BLD: 0.02 K/UL (ref 0–0.2)
BASOPHILS NFR BLD: 0 %
BILIRUB SERPL-MCNC: 0.5 MG/DL (ref 0–1)
BUN SERPL-MCNC: 18 MG/DL (ref 7–20)
CALCIUM SERPL-MCNC: 7.7 MG/DL (ref 8.3–10.6)
CHLORIDE SERPL-SCNC: 107 MMOL/L (ref 99–110)
CO2 SERPL-SCNC: 26 MMOL/L (ref 21–32)
CORTIS SERPL-MCNC: 11.2 UG/DL
CORTISOL COLLECTION INFO: NORMAL
CREAT SERPL-MCNC: 0.8 MG/DL (ref 0.6–1.2)
EOSINOPHIL # BLD: 0.28 K/UL (ref 0–0.6)
EOSINOPHILS RELATIVE PERCENT: 2 %
ERYTHROCYTE [DISTWIDTH] IN BLOOD BY AUTOMATED COUNT: 15.3 % (ref 12.4–15.4)
GFR SERPL CREATININE-BSD FRML MDRD: 69 ML/MIN/1.73M2
GLUCOSE SERPL-MCNC: 108 MG/DL (ref 70–99)
HCT VFR BLD AUTO: 40.1 % (ref 36–48)
HGB BLD-MCNC: 13.1 G/DL (ref 12–16)
IMM GRANULOCYTES # BLD AUTO: 0.04 K/UL (ref 0–0.5)
IMM GRANULOCYTES NFR BLD: 0 %
LACTATE BLDV-SCNC: 0.7 MMOL/L (ref 0.4–2)
LYMPHOCYTES NFR BLD: 0.89 K/UL (ref 1–5.1)
LYMPHOCYTES RELATIVE PERCENT: 7 %
MAGNESIUM SERPL-MCNC: 1.8 MG/DL (ref 1.8–2.4)
MCH RBC QN AUTO: 31.4 PG (ref 26–34)
MCHC RBC AUTO-ENTMCNC: 32.7 G/DL (ref 31–36)
MCV RBC AUTO: 96.2 FL (ref 80–100)
MONOCYTES NFR BLD: 0.76 K/UL (ref 0–1.3)
MONOCYTES NFR BLD: 6 %
NEUTROPHILS NFR BLD: 85 %
NEUTS SEG NFR BLD: 10.94 K/UL (ref 1.7–7.7)
PLATELET # BLD AUTO: 185 K/UL (ref 135–450)
PMV BLD AUTO: 10.7 FL
POTASSIUM SERPL-SCNC: 3.4 MMOL/L (ref 3.5–5.1)
PROCALCITONIN SERPL-MCNC: 0.08 NG/ML (ref 0–0.15)
PROT SERPL-MCNC: 5.1 G/DL (ref 6.4–8.2)
RBC # BLD AUTO: 4.17 M/UL (ref 4–5.2)
SODIUM SERPL-SCNC: 140 MMOL/L (ref 136–145)
WBC OTHER # BLD: 12.9 K/UL (ref 4–11)

## 2024-04-27 PROCEDURE — 99291 CRITICAL CARE FIRST HOUR: CPT | Performed by: NURSE PRACTITIONER

## 2024-04-27 PROCEDURE — 36415 COLL VENOUS BLD VENIPUNCTURE: CPT

## 2024-04-27 PROCEDURE — 71045 X-RAY EXAM CHEST 1 VIEW: CPT

## 2024-04-27 PROCEDURE — 2580000003 HC RX 258: Performed by: INTERNAL MEDICINE

## 2024-04-27 PROCEDURE — 94640 AIRWAY INHALATION TREATMENT: CPT

## 2024-04-27 PROCEDURE — 99223 1ST HOSP IP/OBS HIGH 75: CPT | Performed by: INTERNAL MEDICINE

## 2024-04-27 PROCEDURE — 6370000000 HC RX 637 (ALT 250 FOR IP): Performed by: INTERNAL MEDICINE

## 2024-04-27 PROCEDURE — 94761 N-INVAS EAR/PLS OXIMETRY MLT: CPT

## 2024-04-27 PROCEDURE — 6360000002 HC RX W HCPCS: Performed by: INTERNAL MEDICINE

## 2024-04-27 PROCEDURE — 2000000000 HC ICU R&B

## 2024-04-27 PROCEDURE — 84145 PROCALCITONIN (PCT): CPT

## 2024-04-27 PROCEDURE — 82533 TOTAL CORTISOL: CPT

## 2024-04-27 PROCEDURE — 2500000003 HC RX 250 WO HCPCS: Performed by: NURSE PRACTITIONER

## 2024-04-27 PROCEDURE — 85025 COMPLETE CBC W/AUTO DIFF WBC: CPT

## 2024-04-27 PROCEDURE — 2700000000 HC OXYGEN THERAPY PER DAY

## 2024-04-27 PROCEDURE — 94760 N-INVAS EAR/PLS OXIMETRY 1: CPT

## 2024-04-27 PROCEDURE — 83605 ASSAY OF LACTIC ACID: CPT

## 2024-04-27 PROCEDURE — 36569 INSJ PICC 5 YR+ W/O IMAGING: CPT

## 2024-04-27 PROCEDURE — 85520 HEPARIN ASSAY: CPT

## 2024-04-27 PROCEDURE — 83735 ASSAY OF MAGNESIUM: CPT

## 2024-04-27 PROCEDURE — 80053 COMPREHEN METABOLIC PANEL: CPT

## 2024-04-27 RX ORDER — CALCIUM GLUCONATE 20 MG/ML
2000 INJECTION, SOLUTION INTRAVENOUS ONCE
Status: COMPLETED | OUTPATIENT
Start: 2024-04-27 | End: 2024-04-27

## 2024-04-27 RX ADMIN — POTASSIUM CHLORIDE 40 MEQ: 1500 TABLET, EXTENDED RELEASE ORAL at 08:59

## 2024-04-27 RX ADMIN — Medication 10 ML: at 20:06

## 2024-04-27 RX ADMIN — HEPARIN SODIUM 14 UNITS/KG/HR: 10000 INJECTION, SOLUTION INTRAVENOUS at 17:35

## 2024-04-27 RX ADMIN — CALCIUM GLUCONATE 2000 MG: 20 INJECTION, SOLUTION INTRAVENOUS at 09:24

## 2024-04-27 RX ADMIN — IPRATROPIUM BROMIDE AND ALBUTEROL SULFATE 1 DOSE: 2.5; .5 SOLUTION RESPIRATORY (INHALATION) at 20:47

## 2024-04-27 RX ADMIN — Medication 10 ML: at 08:30

## 2024-04-27 RX ADMIN — ASPIRIN 81 MG: 81 TABLET, COATED ORAL at 08:22

## 2024-04-27 RX ADMIN — PIPERACILLIN AND TAZOBACTAM 3375 MG: 3; .375 INJECTION, POWDER, LYOPHILIZED, FOR SOLUTION INTRAVENOUS at 03:05

## 2024-04-27 RX ADMIN — PANTOPRAZOLE SODIUM 40 MG: 40 TABLET, DELAYED RELEASE ORAL at 06:05

## 2024-04-27 RX ADMIN — PIPERACILLIN AND TAZOBACTAM 3375 MG: 3; .375 INJECTION, POWDER, LYOPHILIZED, FOR SOLUTION INTRAVENOUS at 09:04

## 2024-04-27 RX ADMIN — IPRATROPIUM BROMIDE AND ALBUTEROL SULFATE 1 DOSE: 2.5; .5 SOLUTION RESPIRATORY (INHALATION) at 15:40

## 2024-04-27 RX ADMIN — ATORVASTATIN CALCIUM 40 MG: 40 TABLET, FILM COATED ORAL at 20:06

## 2024-04-27 RX ADMIN — ACETAMINOPHEN 650 MG: 325 TABLET ORAL at 20:08

## 2024-04-27 RX ADMIN — IPRATROPIUM BROMIDE AND ALBUTEROL SULFATE 1 DOSE: 2.5; .5 SOLUTION RESPIRATORY (INHALATION) at 08:47

## 2024-04-27 RX ADMIN — PIPERACILLIN AND TAZOBACTAM 3375 MG: 3; .375 INJECTION, POWDER, LYOPHILIZED, FOR SOLUTION INTRAVENOUS at 17:40

## 2024-04-27 ASSESSMENT — PAIN SCALES - GENERAL
PAINLEVEL_OUTOF10: 1
PAINLEVEL_OUTOF10: 0
PAINLEVEL_OUTOF10: 1
PAINLEVEL_OUTOF10: 0

## 2024-04-27 ASSESSMENT — PAIN SCALES - WONG BAKER
WONGBAKER_NUMERICALRESPONSE: NO HURT

## 2024-04-27 ASSESSMENT — PAIN DESCRIPTION - FREQUENCY: FREQUENCY: INTERMITTENT

## 2024-04-27 ASSESSMENT — PAIN DESCRIPTION - ORIENTATION
ORIENTATION: LOWER;POSTERIOR
ORIENTATION: LOWER;POSTERIOR

## 2024-04-27 ASSESSMENT — PAIN DESCRIPTION - ONSET: ONSET: GRADUAL

## 2024-04-27 ASSESSMENT — PAIN DESCRIPTION - LOCATION
LOCATION: HEAD
LOCATION: HEAD

## 2024-04-27 ASSESSMENT — PAIN DESCRIPTION - DESCRIPTORS
DESCRIPTORS: ACHING;TENDER
DESCRIPTORS: ACHING;TENDER

## 2024-04-27 ASSESSMENT — PAIN DESCRIPTION - PAIN TYPE: TYPE: ACUTE PAIN

## 2024-04-27 ASSESSMENT — PAIN - FUNCTIONAL ASSESSMENT
PAIN_FUNCTIONAL_ASSESSMENT: ACTIVITIES ARE NOT PREVENTED
PAIN_FUNCTIONAL_ASSESSMENT: ACTIVITIES ARE NOT PREVENTED

## 2024-04-27 NOTE — PLAN OF CARE
Problem: Discharge Planning  Goal: Discharge to home or other facility with appropriate resources  4/27/2024 1149 by Leola Pacheco RN  Outcome: Progressing  Flowsheets (Taken 4/27/2024 0800)  Discharge to home or other facility with appropriate resources: Identify barriers to discharge with patient and caregiver  4/26/2024 2358 by Km Shrestha RN  Outcome: Progressing  Flowsheets (Taken 4/26/2024 2116 by Alexandra Huntley, RN)  Discharge to home or other facility with appropriate resources:   Identify barriers to discharge with patient and caregiver   Identify discharge learning needs (meds, wound care, etc)   Refer to discharge planning if patient needs post-hospital services based on physician order or complex needs related to functional status, cognitive ability or social support system   Arrange for needed discharge resources and transportation as appropriate     Problem: Pain  Goal: Verbalizes/displays adequate comfort level or baseline comfort level  4/27/2024 1149 by Leola Pacheco RN  Outcome: Progressing  4/26/2024 2358 by Km Shrestha RN  Outcome: Progressing     Problem: Skin/Tissue Integrity  Goal: Absence of new skin breakdown  Description: 1.  Monitor for areas of redness and/or skin breakdown  2.  Assess vascular access sites hourly  3.  Every 4-6 hours minimum:  Change oxygen saturation probe site  4.  Every 4-6 hours:  If on nasal continuous positive airway pressure, respiratory therapy assess nares and determine need for appliance change or resting period.  4/27/2024 1149 by Leola Pacheco RN  Outcome: Progressing  4/26/2024 2358 by Km Shrestha RN  Outcome: Progressing     Problem: Safety - Adult  Goal: Free from fall injury  4/27/2024 1149 by Leola Pacheco RN  Outcome: Progressing  4/26/2024 2358 by Km Shrestha RN  Outcome: Progressing     Problem: ABCDS Injury Assessment  Goal: Absence of physical injury  4/27/2024 1149 by Leola Pacheco RN  Outcome: Progressing  4/26/2024

## 2024-04-27 NOTE — SIGNIFICANT EVENT
I was notified patient had a mural thrombus with patient being hypotensive and no access.      Evaluated the patient, patient was asymptomatic had no current complaints reported that she has been stuck multiple times and each time infiltrated.  Patient denied chest pain shortness of breath much appreciated dysuria .initially noted patient's BP hypotensive <MAP 65 discussed with family about central line placement.  Central line placed, please see procedure note.  Patient was started on 30 mill per KG of fluids, blood cultures, lactic acid and, escalated antibiotic from ceftriaxone to Zosyn.  Despite fluids patient remained hypotensive patient was transferred to the ICU for Levophed pressor support and critical care.  Noted patient troponin was elevated, echo performed today showed normal size and thickness with EF being 60%. Suspected the elevated troponin is type II in the setting of patient being in septic shock      Total critical care time caring for this patient with life threatening, unstable organ failure, including direct patient contact, management of life support systems, review of data including imaging and labs, discussions with other team members and physicians at least 41 min so far today, excluding procedures.

## 2024-04-27 NOTE — PLAN OF CARE
Problem: Discharge Planning  Goal: Discharge to home or other facility with appropriate resources  Outcome: Progressing  Flowsheets (Taken 4/26/2024 2116 by Alexandra Huntley, RN)  Discharge to home or other facility with appropriate resources:   Identify barriers to discharge with patient and caregiver   Identify discharge learning needs (meds, wound care, etc)   Refer to discharge planning if patient needs post-hospital services based on physician order or complex needs related to functional status, cognitive ability or social support system   Arrange for needed discharge resources and transportation as appropriate     Problem: Pain  Goal: Verbalizes/displays adequate comfort level or baseline comfort level  4/26/2024 2358 by Km Shrestha RN  Outcome: Progressing  4/26/2024 1011 by Ysabel Pérez RN  Outcome: Progressing     Problem: Skin/Tissue Integrity  Goal: Absence of new skin breakdown  Description: 1.  Monitor for areas of redness and/or skin breakdown  2.  Assess vascular access sites hourly  3.  Every 4-6 hours minimum:  Change oxygen saturation probe site  4.  Every 4-6 hours:  If on nasal continuous positive airway pressure, respiratory therapy assess nares and determine need for appliance change or resting period.  Outcome: Progressing     Problem: Safety - Adult  Goal: Free from fall injury  4/26/2024 2358 by Km Shrestha, RN  Outcome: Progressing  4/26/2024 1011 by Ysabel Pérez RN  Outcome: Progressing     Problem: ABCDS Injury Assessment  Goal: Absence of physical injury  Outcome: Progressing  Flowsheets (Taken 4/26/2024 2113 by Alexandra Huntley, RN)  Absence of Physical Injury: Implement safety measures based on patient assessment     Problem: Nutrition Deficit:  Goal: Optimize nutritional status  Outcome: Progressing

## 2024-04-27 NOTE — PROCEDURES
INDICATION:  Shock    ATTENDING PHYSICIAN:     PROCEDURE: Central venous line placement right internal jugular vein    CONSENT:  The procedure, risks, and benefits were explained to patient and son via phone and consent was obtained.    TIME OUT:  The patient and procedure were properly identified with the nurse in the room.    STERILE PREP:  Full maximum sterile field/barrier technique was followed (with cap and mask and sterile gown and sterile gloves and large sterile sheet and hand hygeine and 2% chlorhexidine for cutaneous antisepsis).    LOCAL ANESTHETIC:   Aqueous lidocaine 5cc    PROCEDURE:   Using direct ultrasound guidance, a right internal jugular vein central venous catheter was placed using a modified seldinger technique without difficulty. Excellent return of non-pulsatile, dark venous blood from all lumens. All lumens were flushed with normal saline. Minimal bleeding occurred and there was hemostasis prior to conclusion of procedure. Catheter was sutured in place and a sterile dressing with biopatch was placed.    COMPLICATIONS:  None  Estimated blood loss: 10 cc    CHEST XRAY REVIEWED: Post-procedure chest x-ray is pending at this time

## 2024-04-27 NOTE — CONSULTS
4/27/2024  Radha Daley    Reason for Consult:  Bladder mass  Requesting Physician:  mala      History Obtained From:  patient, electronic medical record    HISTORY OF PRESENT ILLNESS:                The patient is a 83 y.o. female who presents with weakness.  She has had a full workup and found to have mostly cardiopulmonary issues leading to this admission. A CT obtained did show evidence of a polypoid mass in the lumen of the bladder. 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    Past Medical History:        Diagnosis Date    Breast cancer (HCC)     History of therapeutic radiation      Past Surgical History:    History reviewed. No pertinent surgical history.  Current Medications:   Current Facility-Administered Medications: piperacillin-tazobactam (ZOSYN) 3,375 mg in sodium chloride 0.9 % 50 mL IVPB ([id number]), 3,375 mg, IntraVENous, Q8H  ipratropium 0.5 mg-albuterol 2.5 mg (DUONEB) nebulizer solution 1 Dose, 1 Dose, Inhalation, Q4H WA RT  heparin (porcine) injection 5,100 Units, 80 Units/kg, IntraVENous, PRN  heparin (porcine) injection 2,600 Units, 40 Units/kg, IntraVENous, PRN  heparin 25,000 units in dextrose 5% 250 mL (premix) infusion, 5-30 Units/kg/hr, IntraVENous, Continuous  lidocaine PF 1 % injection 5 mL, 5 mL, IntraDERmal, Once  sodium chloride flush 0.9 % injection 5-40 mL, 5-40 mL, IntraVENous, 2 times per day  sodium chloride flush 0.9 % injection 5-40 mL, 5-40 mL, IntraVENous, PRN  0.9 % sodium chloride infusion, 25 mL, IntraVENous, PRN  norepinephrine (LEVOPHED) 16 mg in sodium chloride 0.9 % 250 mL infusion, 1-100 mcg/min, IntraVENous, Continuous  0.9 % sodium chloride infusion, , IntraVENous, PRN  potassium chloride (KLOR-CON M) extended release tablet 40 mEq, 40 mEq, Oral, PRN **OR** potassium bicarb-citric acid (EFFER-K) effervescent tablet 40 mEq, 40 mEq, Oral, PRN **OR** potassium chloride 10 mEq/100 mL IVPB 
[facility]   CONSULTATION  [phone]      Chief Complaint   Patient presents with    Fall     Fall, down for approximately 4 hours, no injuries            History of Present Illness:  Radha Daley is a 83 y.o. patient who presented to the hospital with complaints of weakness and fall. She was at home on the toilet but could not get up. She fell to the floor and crawled to the living room and dialed 911. She was down for several hours. She was brought to the ED. I have been asked to provide consultation regarding further management and testing.      Past Medical History:   has a past medical history of Breast cancer (HCC) and History of therapeutic radiation.    Surgical History:   has no past surgical history on file.     Social History:   reports that she has never smoked. She has never used smokeless tobacco.     Family History:  family history includes Breast Cancer in her mother, niece, and sister.     Home Medications:  Were reviewed and are listed in nursing record. and/or listed below  Prior to Admission medications    Medication Sig Start Date End Date Taking? Authorizing Provider   propranolol (INDERAL LA) 120 MG extended release capsule Take 1 capsule by mouth daily 3/1/16  Yes Michele Tamayo MD   valsartan (DIOVAN) 160 MG tablet Take 1 tablet by mouth daily 3/19/24  Yes Michele Tamayo MD   pravastatin (PRAVACHOL) 40 MG tablet Take 1 tablet by mouth daily    ProviderMichele MD        Current Medications:  Current Facility-Administered Medications   Medication Dose Route Frequency Provider Last Rate Last Admin    ipratropium 0.5 mg-albuterol 2.5 mg (DUONEB) nebulizer solution 1 Dose  1 Dose Inhalation Q4H WA RT Neil Rome MD   1 Dose at 04/26/24 2039    heparin (porcine) injection 5,100 Units  80 Units/kg IntraVENous PRN Neil Rome MD        heparin (porcine) injection 2,600 Units  40 Units/kg IntraVENous PRN Neil Rome MD        heparin 25,000 units in 
  3. Fundal gallbladder adenomyomatosis not excluded   4. Incidental eccentric suprarenal left lateral aortic soft plaque which has a 4   mm free-floating component. Correlate clinically. Can be a source of lower   extremity distal thromboemboli in certain clinical situations.   (ie.  endovascular interventions)      VL Extremity Venous Bilateral   Final Result      CT Head W/O Contrast   Final Result      1. No acute intracranial hemorrhage.   2. Age-related cortical atrophy.      Electronically signed by Carlos Enrique Gomez      XR HIP RIGHT (2-3 VIEWS)   Final Result   1. No acute bony abnormality.      Electronically signed by Carlos Enrique Gomez      XR CHEST PORTABLE   Final Result   No acute findings.      Electronically signed by Carlos Enrique Gomez             Assessment/Plan   83 y.o. female with weakness, UTI and suprarenal aortic thrombus    UTI  Bladder mass:  - Rocephin for 4 days  - Follow cultures  - IV hydration  - Urology consult for possible cysto    HTN  CAD  HLD:  - Continue home Cozaar 50 mg  - Continue propranolol 120 mg  - Continue ASA 81 mg & Lipitor 40 mg    GERD:  - Protonix 40 mg daily    Suprarenal aortic thrombus:  - Consult vascular surgery  - Heparin gtt  - DVT studies completed and negative  - Echo completed with no PFO nor clots noted  - CTPE without evidence for PE  - Repeat CTA of the abdomen to evaluate for mesenteric ischemia also negative    Respiratory insufficiency:  - Suspect given her dilated RV noted on echo, she is possibly hypoxemic chronically and never has checked her own SpO2 at home  - Will check procalcitonin  - Likely needs home O2 eval  - Duo-nebs ordered  - Can consider brovana & pulmicort nebs      Pio Barrios, APRN - CNP      Patient seen, examined and discussed with the NP Denis and I agree with the assessment and plan.  Briefly, this is a 83 y.o. female with COPD who was too weak to get up off her couch.    Patient detailed how she couldn't stand, and tried

## 2024-04-27 NOTE — RT PROTOCOL NOTE
RT Nebulizer Bronchodilator Protocol Note    There is a bronchodilator order in the chart from a provider indicating to follow the RT Bronchodilator Protocol and there is an “Initiate RT Bronchodilator Protocol” order as well (see protocol at bottom of note).    CXR Findings:  XR CHEST PORTABLE    Result Date: 4/26/2024  Small left pleural effusion. Electronically signed by Carlos Enrique Gomez    XR CHEST PORTABLE    Result Date: 4/25/2024  No acute findings. Electronically signed by Carlos Enrique Gomez      The findings from the last RT Protocol Assessment were as follows:  Smoking: Chronic pulmonary disease  Respiratory Pattern: Dyspnea on exertion or RR 21-25 bpm  Breath Sounds: Slightly diminished and/or crackles  Cough: Strong, spontaneous, non-productive  Indication for Bronchodilator Therapy: Decreased or absent breath sounds  Bronchodilator Assessment Score: 6    Aerosolized bronchodilator medication orders have been revised according to the RT Nebulizer Bronchodilator Protocol below.    Respiratory Therapist to perform RT Therapy Protocol Assessment initially then follow the protocol.  Repeat RT Therapy Protocol Assessment PRN for score 0-3 or on second treatment, BID, and PRN for scores above 3.    No Indications - adjust the frequency to every 6 hours PRN wheezing or bronchospasm, if no treatments needed after 48 hours then discontinue using Per Protocol order mode.     If indication present, adjust the RT bronchodilator orders based on the Bronchodilator Assessment Score as indicated below.  If a patient is on this medication at home then do not decrease Frequency below that used at home.    0-3 - enter or revise RT bronchodilator order(s) to equivalent RT Bronchodilator order with Frequency of every 4 hours PRN for wheezing or increased work of breathing using Per Protocol order mode.       4-6 - enter or revise RT Bronchodilator order(s) to two equivalent RT bronchodilator orders with one order with BID

## 2024-04-28 LAB
ALBUMIN SERPL-MCNC: 2.5 G/DL (ref 3.4–5)
ALBUMIN/GLOB SERPL: 1.2 {RATIO}
ALBUMIN: 2.9 G/DL (ref 3.4–5)
ALP SERPL-CCNC: 57 U/L (ref 40–129)
ALT SERPL-CCNC: 18 U/L (ref 10–40)
ANION GAP SERPL CALCULATED.3IONS-SCNC: 5 MMOL/L (ref 3–16)
ANION GAP SERPL CALCULATED.3IONS-SCNC: 7 MMOL/L (ref 3–16)
ANTI-XA UNFRAC HEPARIN: 0.39 IU/L (ref 0.3–0.7)
ANTI-XA UNFRAC HEPARIN: 0.46 IU/L (ref 0.3–0.7)
AST SERPL-CCNC: 32 U/L (ref 15–37)
BASOPHILS # BLD: 0.01 K/UL (ref 0–0.2)
BASOPHILS NFR BLD: 0 %
BILIRUB SERPL-MCNC: 0.6 MG/DL (ref 0–1)
BUN SERPL-MCNC: 13 MG/DL (ref 7–20)
BUN SERPL-MCNC: 14 MG/DL (ref 7–20)
CALCIUM SERPL-MCNC: 7.8 MG/DL (ref 8.3–10.6)
CALCIUM SERPL-MCNC: 7.8 MG/DL (ref 8.3–10.6)
CHLORIDE SERPL-SCNC: 105 MMOL/L (ref 99–110)
CHLORIDE SERPL-SCNC: 110 MMOL/L (ref 99–110)
CO2 SERPL-SCNC: 26 MMOL/L (ref 21–32)
CO2 SERPL-SCNC: 27 MMOL/L (ref 21–32)
CREAT SERPL-MCNC: 0.8 MG/DL (ref 0.6–1.2)
CREAT SERPL-MCNC: 1 MG/DL (ref 0.6–1.2)
EOSINOPHIL # BLD: 0.34 K/UL (ref 0–0.6)
EOSINOPHILS RELATIVE PERCENT: 5 %
ERYTHROCYTE [DISTWIDTH] IN BLOOD BY AUTOMATED COUNT: 15.3 % (ref 12.4–15.4)
GFR SERPL CREATININE-BSD FRML MDRD: 57 ML/MIN/1.73M2
GFR SERPL CREATININE-BSD FRML MDRD: 73 ML/MIN/1.73M2
GLUCOSE SERPL-MCNC: 104 MG/DL (ref 70–99)
GLUCOSE SERPL-MCNC: 135 MG/DL (ref 70–99)
HCT VFR BLD AUTO: 36.8 % (ref 36–48)
HGB BLD-MCNC: 11.9 G/DL (ref 12–16)
IMM GRANULOCYTES # BLD AUTO: 0.01 K/UL (ref 0–0.5)
IMM GRANULOCYTES NFR BLD: 0 %
LACTATE BLDV-SCNC: 1.3 MMOL/L (ref 0.4–2)
LYMPHOCYTES NFR BLD: 0.67 K/UL (ref 1–5.1)
LYMPHOCYTES RELATIVE PERCENT: 9 %
MAGNESIUM SERPL-MCNC: 1.7 MG/DL (ref 1.8–2.4)
MCH RBC QN AUTO: 31.2 PG (ref 26–34)
MCHC RBC AUTO-ENTMCNC: 32.3 G/DL (ref 31–36)
MCV RBC AUTO: 96.3 FL (ref 80–100)
MONOCYTES NFR BLD: 0.38 K/UL (ref 0–1.3)
MONOCYTES NFR BLD: 5 %
NEUTROPHILS NFR BLD: 81 %
NEUTS SEG NFR BLD: 5.92 K/UL (ref 1.7–7.7)
PHOSPHATE SERPL-MCNC: 2.1 MG/DL (ref 2.5–4.9)
PLATELET # BLD AUTO: 136 K/UL (ref 135–450)
PMV BLD AUTO: 10.5 FL
POTASSIUM SERPL-SCNC: 3.4 MMOL/L (ref 3.5–5.1)
POTASSIUM SERPL-SCNC: 3.8 MMOL/L (ref 3.5–5.1)
PROT SERPL-MCNC: 4.6 G/DL (ref 6.4–8.2)
RBC # BLD AUTO: 3.82 M/UL (ref 4–5.2)
SODIUM SERPL-SCNC: 138 MMOL/L (ref 136–145)
SODIUM SERPL-SCNC: 142 MMOL/L (ref 136–145)
WBC OTHER # BLD: 7.3 K/UL (ref 4–11)

## 2024-04-28 PROCEDURE — 85520 HEPARIN ASSAY: CPT

## 2024-04-28 PROCEDURE — 99291 CRITICAL CARE FIRST HOUR: CPT | Performed by: INTERNAL MEDICINE

## 2024-04-28 PROCEDURE — P9047 ALBUMIN (HUMAN), 25%, 50ML: HCPCS | Performed by: INTERNAL MEDICINE

## 2024-04-28 PROCEDURE — 2500000003 HC RX 250 WO HCPCS: Performed by: INTERNAL MEDICINE

## 2024-04-28 PROCEDURE — 6360000002 HC RX W HCPCS: Performed by: INTERNAL MEDICINE

## 2024-04-28 PROCEDURE — 6360000002 HC RX W HCPCS: Performed by: NURSE PRACTITIONER

## 2024-04-28 PROCEDURE — 2580000003 HC RX 258: Performed by: NURSE PRACTITIONER

## 2024-04-28 PROCEDURE — 2580000003 HC RX 258: Performed by: INTERNAL MEDICINE

## 2024-04-28 PROCEDURE — 6370000000 HC RX 637 (ALT 250 FOR IP): Performed by: INTERNAL MEDICINE

## 2024-04-28 PROCEDURE — 83735 ASSAY OF MAGNESIUM: CPT

## 2024-04-28 PROCEDURE — 99232 SBSQ HOSP IP/OBS MODERATE 35: CPT | Performed by: NURSE PRACTITIONER

## 2024-04-28 PROCEDURE — 94640 AIRWAY INHALATION TREATMENT: CPT

## 2024-04-28 PROCEDURE — 93005 ELECTROCARDIOGRAM TRACING: CPT | Performed by: NURSE PRACTITIONER

## 2024-04-28 PROCEDURE — 83605 ASSAY OF LACTIC ACID: CPT

## 2024-04-28 PROCEDURE — 80069 RENAL FUNCTION PANEL: CPT

## 2024-04-28 PROCEDURE — 2000000000 HC ICU R&B

## 2024-04-28 PROCEDURE — 97530 THERAPEUTIC ACTIVITIES: CPT

## 2024-04-28 PROCEDURE — 2500000003 HC RX 250 WO HCPCS: Performed by: NURSE PRACTITIONER

## 2024-04-28 PROCEDURE — 2700000000 HC OXYGEN THERAPY PER DAY

## 2024-04-28 PROCEDURE — 94760 N-INVAS EAR/PLS OXIMETRY 1: CPT

## 2024-04-28 PROCEDURE — 85025 COMPLETE CBC W/AUTO DIFF WBC: CPT

## 2024-04-28 PROCEDURE — 80053 COMPREHEN METABOLIC PANEL: CPT

## 2024-04-28 RX ORDER — ALBUMIN (HUMAN) 12.5 G/50ML
25 SOLUTION INTRAVENOUS ONCE
Status: COMPLETED | OUTPATIENT
Start: 2024-04-28 | End: 2024-04-28

## 2024-04-28 RX ORDER — 0.9 % SODIUM CHLORIDE 0.9 %
500 INTRAVENOUS SOLUTION INTRAVENOUS ONCE
Status: COMPLETED | OUTPATIENT
Start: 2024-04-28 | End: 2024-04-28

## 2024-04-28 RX ORDER — MAGNESIUM SULFATE 1 G/100ML
1000 INJECTION INTRAVENOUS ONCE
Status: COMPLETED | OUTPATIENT
Start: 2024-04-28 | End: 2024-04-28

## 2024-04-28 RX ORDER — LEVALBUTEROL INHALATION SOLUTION 0.63 MG/3ML
0.63 SOLUTION RESPIRATORY (INHALATION)
Status: DISCONTINUED | OUTPATIENT
Start: 2024-04-28 | End: 2024-04-28

## 2024-04-28 RX ORDER — CALCIUM GLUCONATE 10 MG/ML
1000 INJECTION, SOLUTION INTRAVENOUS ONCE
Status: COMPLETED | OUTPATIENT
Start: 2024-04-28 | End: 2024-04-28

## 2024-04-28 RX ORDER — LEVALBUTEROL INHALATION SOLUTION 0.63 MG/3ML
0.63 SOLUTION RESPIRATORY (INHALATION)
Status: DISCONTINUED | OUTPATIENT
Start: 2024-04-28 | End: 2024-04-30 | Stop reason: HOSPADM

## 2024-04-28 RX ADMIN — ASPIRIN 81 MG: 81 TABLET, COATED ORAL at 08:40

## 2024-04-28 RX ADMIN — ACETAMINOPHEN 650 MG: 325 TABLET ORAL at 15:18

## 2024-04-28 RX ADMIN — AMIODARONE HYDROCHLORIDE 1 MG/MIN: 1.8 INJECTION, SOLUTION INTRAVENOUS at 10:15

## 2024-04-28 RX ADMIN — Medication 10 ML: at 08:38

## 2024-04-28 RX ADMIN — PANTOPRAZOLE SODIUM 40 MG: 40 TABLET, DELAYED RELEASE ORAL at 05:58

## 2024-04-28 RX ADMIN — LEVALBUTEROL HYDROCHLORIDE 0.63 MG: 0.63 SOLUTION RESPIRATORY (INHALATION) at 12:57

## 2024-04-28 RX ADMIN — LEVALBUTEROL HYDROCHLORIDE 0.63 MG: 0.63 SOLUTION RESPIRATORY (INHALATION) at 20:42

## 2024-04-28 RX ADMIN — POTASSIUM CHLORIDE 40 MEQ: 1500 TABLET, EXTENDED RELEASE ORAL at 06:46

## 2024-04-28 RX ADMIN — AMIODARONE HYDROCHLORIDE 0.5 MG/MIN: 1.8 INJECTION, SOLUTION INTRAVENOUS at 15:46

## 2024-04-28 RX ADMIN — AMIODARONE HYDROCHLORIDE 150 MG: 1.5 INJECTION, SOLUTION INTRAVENOUS at 09:56

## 2024-04-28 RX ADMIN — IPRATROPIUM BROMIDE AND ALBUTEROL SULFATE 1 DOSE: 2.5; .5 SOLUTION RESPIRATORY (INHALATION) at 09:07

## 2024-04-28 RX ADMIN — ATORVASTATIN CALCIUM 40 MG: 40 TABLET, FILM COATED ORAL at 20:23

## 2024-04-28 RX ADMIN — LEVALBUTEROL HYDROCHLORIDE 0.63 MG: 0.63 SOLUTION RESPIRATORY (INHALATION) at 16:13

## 2024-04-28 RX ADMIN — HEPARIN SODIUM 14 UNITS/KG/HR: 10000 INJECTION, SOLUTION INTRAVENOUS at 21:32

## 2024-04-28 RX ADMIN — PIPERACILLIN AND TAZOBACTAM 3375 MG: 3; .375 INJECTION, POWDER, LYOPHILIZED, FOR SOLUTION INTRAVENOUS at 09:22

## 2024-04-28 RX ADMIN — CALCIUM GLUCONATE 1000 MG: 10 INJECTION, SOLUTION INTRAVENOUS at 09:29

## 2024-04-28 RX ADMIN — SODIUM CHLORIDE 500 ML: 9 INJECTION, SOLUTION INTRAVENOUS at 16:04

## 2024-04-28 RX ADMIN — ALBUMIN (HUMAN) 25 G: 0.25 INJECTION, SOLUTION INTRAVENOUS at 08:37

## 2024-04-28 RX ADMIN — PIPERACILLIN AND TAZOBACTAM 3375 MG: 3; .375 INJECTION, POWDER, LYOPHILIZED, FOR SOLUTION INTRAVENOUS at 01:11

## 2024-04-28 RX ADMIN — PIPERACILLIN AND TAZOBACTAM 3375 MG: 3; .375 INJECTION, POWDER, LYOPHILIZED, FOR SOLUTION INTRAVENOUS at 17:10

## 2024-04-28 RX ADMIN — Medication 10 ML: at 20:26

## 2024-04-28 RX ADMIN — MAGNESIUM SULFATE HEPTAHYDRATE 1000 MG: 1 INJECTION, SOLUTION INTRAVENOUS at 10:22

## 2024-04-28 ASSESSMENT — PAIN SCALES - WONG BAKER

## 2024-04-28 ASSESSMENT — PAIN SCALES - GENERAL
PAINLEVEL_OUTOF10: 0

## 2024-04-28 ASSESSMENT — PAIN DESCRIPTION - DESCRIPTORS: DESCRIPTORS: ACHING

## 2024-04-28 ASSESSMENT — PAIN DESCRIPTION - LOCATION: LOCATION: HEAD

## 2024-04-28 ASSESSMENT — PAIN DESCRIPTION - ORIENTATION: ORIENTATION: OTHER (COMMENT)

## 2024-04-28 NOTE — PLAN OF CARE
Problem: Discharge Planning  Goal: Discharge to home or other facility with appropriate resources  Recent Flowsheet Documentation  Taken 4/28/2024 0800 by Leola Pacheco RN  Discharge to home or other facility with appropriate resources:   Identify barriers to discharge with patient and caregiver   Arrange for needed discharge resources and transportation as appropriate   Identify discharge learning needs (meds, wound care, etc)     Problem: Discharge Planning  Goal: Discharge to home or other facility with appropriate resources  Outcome: Progressing  Flowsheets (Taken 4/28/2024 0800)  Discharge to home or other facility with appropriate resources:   Identify barriers to discharge with patient and caregiver   Arrange for needed discharge resources and transportation as appropriate   Identify discharge learning needs (meds, wound care, etc)     Problem: Pain  Goal: Verbalizes/displays adequate comfort level or baseline comfort level  Outcome: Progressing     Problem: Skin/Tissue Integrity  Goal: Absence of new skin breakdown  Description: 1.  Monitor for areas of redness and/or skin breakdown  2.  Assess vascular access sites hourly  3.  Every 4-6 hours minimum:  Change oxygen saturation probe site  4.  Every 4-6 hours:  If on nasal continuous positive airway pressure, respiratory therapy assess nares and determine need for appliance change or resting period.  Outcome: Progressing     Problem: Safety - Adult  Goal: Free from fall injury  Outcome: Progressing     Problem: ABCDS Injury Assessment  Goal: Absence of physical injury  Outcome: Progressing     Problem: Nutrition Deficit:  Goal: Optimize nutritional status  Outcome: Progressing  Flowsheets (Taken 4/28/2024 0603)  Nutrient intake appropriate for improving, restoring, or maintaining nutritional needs: Assess nutritional status and recommend course of action

## 2024-04-28 NOTE — PLAN OF CARE
Problem: Pain  Goal: Verbalizes/displays adequate comfort level or baseline comfort level  4/27/2024 2058 by Ronni Minor RN  Outcome: Progressing  Flowsheets (Taken 4/27/2024 2000)  Verbalizes/displays adequate comfort level or baseline comfort level:   Encourage patient to monitor pain and request assistance   Assess pain using appropriate pain scale   Administer analgesics based on type and severity of pain and evaluate response   Implement non-pharmacological measures as appropriate and evaluate response   Consider cultural and social influences on pain and pain management   Notify Licensed Independent Practitioner if interventions unsuccessful or patient reports new pain  4/27/2024 1149 by Leola Pacheco, RN  Outcome: Progressing     Problem: Skin/Tissue Integrity  Goal: Absence of new skin breakdown  Description: 1.  Monitor for areas of redness and/or skin breakdown  2.  Assess vascular access sites hourly  3.  Every 4-6 hours minimum:  Change oxygen saturation probe site  4.  Every 4-6 hours:  If on nasal continuous positive airway pressure, respiratory therapy assess nares and determine need for appliance change or resting period.  4/27/2024 2058 by Ronni Minor RN  Outcome: Progressing  4/27/2024 1149 by Leola Pacheco RN  Outcome: Progressing     Problem: Safety - Adult  Goal: Free from fall injury  4/27/2024 2058 by Ronni Minor RN  Outcome: Progressing  4/27/2024 1149 by Leola Pacheco RN  Outcome: Progressing     Problem: ABCDS Injury Assessment  Goal: Absence of physical injury  4/27/2024 2058 by Ronni Minor RN  Outcome: Progressing  4/27/2024 1149 by Leola Pacheco RN  Outcome: Progressing     Problem: Nutrition Deficit:  Goal: Optimize nutritional status  4/27/2024 2058 by Ronni Minor RN  Outcome: Progressing  4/27/2024 1149 by Leola Pacheco RN  Outcome: Progressing

## 2024-04-29 ENCOUNTER — APPOINTMENT (OUTPATIENT)
Age: 84
DRG: 308 | End: 2024-04-29
Payer: MEDICARE

## 2024-04-29 LAB
ALBUMIN SERPL-MCNC: 3 G/DL (ref 3.4–5)
ALBUMIN/GLOB SERPL: 1.4 {RATIO}
ALP SERPL-CCNC: 59 U/L (ref 40–129)
ALT SERPL-CCNC: 23 U/L (ref 10–40)
ANION GAP SERPL CALCULATED.3IONS-SCNC: 6 MMOL/L (ref 3–16)
ANTI-XA UNFRAC HEPARIN: 0.36 IU/L (ref 0.3–0.7)
AST SERPL-CCNC: 30 U/L (ref 15–37)
BASOPHILS # BLD: 0.01 K/UL (ref 0–0.2)
BASOPHILS NFR BLD: 0 %
BILIRUB SERPL-MCNC: 0.4 MG/DL (ref 0–1)
BUN SERPL-MCNC: 13 MG/DL (ref 7–20)
CALCIUM SERPL-MCNC: 7.8 MG/DL (ref 8.3–10.6)
CHLORIDE SERPL-SCNC: 107 MMOL/L (ref 99–110)
CO2 SERPL-SCNC: 26 MMOL/L (ref 21–32)
CREAT SERPL-MCNC: 1.1 MG/DL (ref 0.6–1.2)
EKG DIAGNOSIS: NORMAL
EKG Q-T INTERVAL: 356 MS
EKG QRS DURATION: 76 MS
EKG QTC CALCULATION (BAZETT): 437 MS
EKG R AXIS: 56 DEGREES
EKG T AXIS: 99 DEGREES
EKG VENTRICULAR RATE: 91 BPM
EOSINOPHIL # BLD: 0.27 K/UL (ref 0–0.6)
EOSINOPHILS RELATIVE PERCENT: 4 %
ERYTHROCYTE [DISTWIDTH] IN BLOOD BY AUTOMATED COUNT: 15.9 % (ref 12.4–15.4)
GFR SERPL CREATININE-BSD FRML MDRD: 49 ML/MIN/1.73M2
GLUCOSE SERPL-MCNC: 100 MG/DL (ref 70–99)
HCT VFR BLD AUTO: 35.7 % (ref 36–48)
HGB BLD-MCNC: 11.4 G/DL (ref 12–16)
IMM GRANULOCYTES # BLD AUTO: 0.01 K/UL (ref 0–0.5)
IMM GRANULOCYTES NFR BLD: 0 %
LYMPHOCYTES NFR BLD: 0.72 K/UL (ref 1–5.1)
LYMPHOCYTES RELATIVE PERCENT: 10 %
MCH RBC QN AUTO: 31.2 PG (ref 26–34)
MCHC RBC AUTO-ENTMCNC: 31.9 G/DL (ref 31–36)
MCV RBC AUTO: 97.8 FL (ref 80–100)
MONOCYTES NFR BLD: 0.46 K/UL (ref 0–1.3)
MONOCYTES NFR BLD: 7 %
NEUTROPHILS NFR BLD: 79 %
NEUTS SEG NFR BLD: 5.63 K/UL (ref 1.7–7.7)
PLATELET # BLD AUTO: 115 K/UL (ref 135–450)
PMV BLD AUTO: 11.3 FL
POTASSIUM SERPL-SCNC: 3.7 MMOL/L (ref 3.5–5.1)
PROT SERPL-MCNC: 5.1 G/DL (ref 6.4–8.2)
RBC # BLD AUTO: 3.65 M/UL (ref 4–5.2)
SODIUM SERPL-SCNC: 138 MMOL/L (ref 136–145)
WBC OTHER # BLD: 7.1 K/UL (ref 4–11)

## 2024-04-29 PROCEDURE — 2000000000 HC ICU R&B

## 2024-04-29 PROCEDURE — 6360000002 HC RX W HCPCS: Performed by: INTERNAL MEDICINE

## 2024-04-29 PROCEDURE — 85520 HEPARIN ASSAY: CPT

## 2024-04-29 PROCEDURE — 71045 X-RAY EXAM CHEST 1 VIEW: CPT

## 2024-04-29 PROCEDURE — 2500000003 HC RX 250 WO HCPCS: Performed by: INTERNAL MEDICINE

## 2024-04-29 PROCEDURE — 94761 N-INVAS EAR/PLS OXIMETRY MLT: CPT

## 2024-04-29 PROCEDURE — 6370000000 HC RX 637 (ALT 250 FOR IP): Performed by: INTERNAL MEDICINE

## 2024-04-29 PROCEDURE — 97530 THERAPEUTIC ACTIVITIES: CPT

## 2024-04-29 PROCEDURE — 2580000003 HC RX 258: Performed by: INTERNAL MEDICINE

## 2024-04-29 PROCEDURE — 99232 SBSQ HOSP IP/OBS MODERATE 35: CPT | Performed by: NURSE PRACTITIONER

## 2024-04-29 PROCEDURE — 80053 COMPREHEN METABOLIC PANEL: CPT

## 2024-04-29 PROCEDURE — 85025 COMPLETE CBC W/AUTO DIFF WBC: CPT

## 2024-04-29 PROCEDURE — 94640 AIRWAY INHALATION TREATMENT: CPT

## 2024-04-29 PROCEDURE — 2700000000 HC OXYGEN THERAPY PER DAY

## 2024-04-29 RX ORDER — MIDODRINE HYDROCHLORIDE 5 MG/1
5 TABLET ORAL
Status: DISCONTINUED | OUTPATIENT
Start: 2024-04-29 | End: 2024-04-30 | Stop reason: HOSPADM

## 2024-04-29 RX ORDER — AMIODARONE HYDROCHLORIDE 200 MG/1
200 TABLET ORAL 2 TIMES DAILY
Status: DISCONTINUED | OUTPATIENT
Start: 2024-04-29 | End: 2024-04-30 | Stop reason: HOSPADM

## 2024-04-29 RX ADMIN — Medication 10 ML: at 07:48

## 2024-04-29 RX ADMIN — LEVALBUTEROL HYDROCHLORIDE 0.63 MG: 0.63 SOLUTION RESPIRATORY (INHALATION) at 20:36

## 2024-04-29 RX ADMIN — PIPERACILLIN AND TAZOBACTAM 3375 MG: 3; .375 INJECTION, POWDER, LYOPHILIZED, FOR SOLUTION INTRAVENOUS at 01:27

## 2024-04-29 RX ADMIN — LEVALBUTEROL HYDROCHLORIDE 0.63 MG: 0.63 SOLUTION RESPIRATORY (INHALATION) at 11:56

## 2024-04-29 RX ADMIN — ASPIRIN 81 MG: 81 TABLET, COATED ORAL at 07:48

## 2024-04-29 RX ADMIN — Medication 10 ML: at 20:48

## 2024-04-29 RX ADMIN — MIDODRINE HYDROCHLORIDE 5 MG: 5 TABLET ORAL at 18:36

## 2024-04-29 RX ADMIN — MIDODRINE HYDROCHLORIDE 5 MG: 5 TABLET ORAL at 07:48

## 2024-04-29 RX ADMIN — MIDODRINE HYDROCHLORIDE 5 MG: 5 TABLET ORAL at 11:55

## 2024-04-29 RX ADMIN — PIPERACILLIN AND TAZOBACTAM 3375 MG: 3; .375 INJECTION, POWDER, LYOPHILIZED, FOR SOLUTION INTRAVENOUS at 10:05

## 2024-04-29 RX ADMIN — ATORVASTATIN CALCIUM 40 MG: 40 TABLET, FILM COATED ORAL at 20:44

## 2024-04-29 RX ADMIN — APIXABAN 10 MG: 5 TABLET, FILM COATED ORAL at 13:50

## 2024-04-29 RX ADMIN — LEVALBUTEROL HYDROCHLORIDE 0.63 MG: 0.63 SOLUTION RESPIRATORY (INHALATION) at 07:49

## 2024-04-29 RX ADMIN — AMIODARONE HYDROCHLORIDE 0.5 MG/MIN: 1.8 INJECTION, SOLUTION INTRAVENOUS at 04:41

## 2024-04-29 RX ADMIN — AMIODARONE HYDROCHLORIDE 200 MG: 200 TABLET ORAL at 20:44

## 2024-04-29 RX ADMIN — AMIODARONE HYDROCHLORIDE 200 MG: 200 TABLET ORAL at 13:50

## 2024-04-29 RX ADMIN — APIXABAN 10 MG: 5 TABLET, FILM COATED ORAL at 20:44

## 2024-04-29 RX ADMIN — PANTOPRAZOLE SODIUM 40 MG: 40 TABLET, DELAYED RELEASE ORAL at 05:58

## 2024-04-29 ASSESSMENT — ENCOUNTER SYMPTOMS
COUGH: 0
SHORTNESS OF BREATH: 0
ALLERGIC/IMMUNOLOGIC NEGATIVE: 1
BACK PAIN: 1
DIARRHEA: 0
CHEST TIGHTNESS: 0
WHEEZING: 0
ABDOMINAL PAIN: 0
EYES NEGATIVE: 1
VOMITING: 0
STRIDOR: 0
GASTROINTESTINAL NEGATIVE: 1
RESPIRATORY NEGATIVE: 1
NAUSEA: 0

## 2024-04-29 ASSESSMENT — PAIN SCALES - WONG BAKER

## 2024-04-29 ASSESSMENT — PAIN SCALES - GENERAL
PAINLEVEL_OUTOF10: 0
PAINLEVEL_OUTOF10: 0

## 2024-04-29 NOTE — ACP (ADVANCE CARE PLANNING)
Patient would like to be full code. I had a discussion with her regarding compressions and intubation and she expressed that she would like full measure for resuscitation. I have changed her code to reflect her wishes from DNRCCA to Full code.

## 2024-04-29 NOTE — PLAN OF CARE
Problem: Discharge Planning  Goal: Discharge to home or other facility with appropriate resources  4/29/2024 0914 by Leola Rodriguez RN  Outcome: Progressing  Flowsheets (Taken 4/29/2024 0800)  Discharge to home or other facility with appropriate resources:   Identify discharge learning needs (meds, wound care, etc)   Refer to discharge planning if patient needs post-hospital services based on physician order or complex needs related to functional status, cognitive ability or social support system  4/28/2024 2307 by Km Shrestha RN  Outcome: Progressing     Problem: Pain  Goal: Verbalizes/displays adequate comfort level or baseline comfort level  4/29/2024 0914 by Leola Rodriguez RN  Outcome: Progressing  4/28/2024 2307 by Km Shrestha RN  Outcome: Progressing     Problem: Skin/Tissue Integrity  Goal: Absence of new skin breakdown  Description: 1.  Monitor for areas of redness and/or skin breakdown  2.  Assess vascular access sites hourly  3.  Every 4-6 hours minimum:  Change oxygen saturation probe site  4.  Every 4-6 hours:  If on nasal continuous positive airway pressure, respiratory therapy assess nares and determine need for appliance change or resting period.  4/29/2024 0914 by Leola Rodriguez RN  Outcome: Progressing  4/28/2024 2307 by Km Shrestha RN  Outcome: Progressing     Problem: Safety - Adult  Goal: Free from fall injury  4/29/2024 0914 by Leola Rodriguez RN  Outcome: Progressing  4/28/2024 2307 by Km Shrestha RN  Outcome: Progressing     Problem: ABCDS Injury Assessment  Goal: Absence of physical injury  4/29/2024 0914 by Leola Rodriguez RN  Outcome: Progressing  4/28/2024 2307 by Km Shrestha RN  Outcome: Progressing     Problem: Nutrition Deficit:  Goal: Optimize nutritional status  4/29/2024 0914 by Leola Rodriguez RN  Outcome: Progressing  4/28/2024 2307 by Km Shrestha RN  Outcome: Progressing

## 2024-04-30 VITALS
HEART RATE: 86 BPM | BODY MASS INDEX: 30.54 KG/M2 | DIASTOLIC BLOOD PRESSURE: 56 MMHG | TEMPERATURE: 98.6 F | SYSTOLIC BLOOD PRESSURE: 106 MMHG | HEIGHT: 58 IN | RESPIRATION RATE: 31 BRPM | WEIGHT: 145.5 LBS | OXYGEN SATURATION: 93 %

## 2024-04-30 PROBLEM — I48.91 ATRIAL FIBRILLATION (HCC): Status: ACTIVE | Noted: 2024-04-30

## 2024-04-30 PROBLEM — R29.6 FREQUENT FALLS: Status: ACTIVE | Noted: 2024-04-30

## 2024-04-30 LAB
ALBUMIN SERPL-MCNC: 2.7 G/DL (ref 3.4–5)
ALBUMIN/GLOB SERPL: 1.3 {RATIO}
ALP SERPL-CCNC: 64 U/L (ref 40–129)
ALT SERPL-CCNC: 24 U/L (ref 10–40)
ANION GAP SERPL CALCULATED.3IONS-SCNC: 5 MMOL/L (ref 3–16)
AST SERPL-CCNC: 25 U/L (ref 15–37)
BASOPHILS # BLD: 0.01 K/UL (ref 0–0.2)
BASOPHILS NFR BLD: 0 %
BILIRUB SERPL-MCNC: 0.4 MG/DL (ref 0–1)
BUN SERPL-MCNC: 10 MG/DL (ref 7–20)
CALCIUM SERPL-MCNC: 7.6 MG/DL (ref 8.3–10.6)
CHLORIDE SERPL-SCNC: 109 MMOL/L (ref 99–110)
CO2 SERPL-SCNC: 28 MMOL/L (ref 21–32)
CREAT SERPL-MCNC: 0.8 MG/DL (ref 0.6–1.2)
EOSINOPHIL # BLD: 0.22 K/UL (ref 0–0.6)
EOSINOPHILS RELATIVE PERCENT: 3 %
ERYTHROCYTE [DISTWIDTH] IN BLOOD BY AUTOMATED COUNT: 16.1 % (ref 12.4–15.4)
GFR SERPL CREATININE-BSD FRML MDRD: 76 ML/MIN/1.73M2
GLUCOSE SERPL-MCNC: 114 MG/DL (ref 70–99)
HCT VFR BLD AUTO: 35.5 % (ref 36–48)
HGB BLD-MCNC: 11.2 G/DL (ref 12–16)
IMM GRANULOCYTES # BLD AUTO: 0.03 K/UL (ref 0–0.5)
IMM GRANULOCYTES NFR BLD: 0 %
LYMPHOCYTES NFR BLD: 0.62 K/UL (ref 1–5.1)
LYMPHOCYTES RELATIVE PERCENT: 8 %
MCH RBC QN AUTO: 31.2 PG (ref 26–34)
MCHC RBC AUTO-ENTMCNC: 31.5 G/DL (ref 31–36)
MCV RBC AUTO: 98.9 FL (ref 80–100)
MICROORGANISM SPEC CULT: NORMAL
MICROORGANISM SPEC CULT: NORMAL
MONOCYTES NFR BLD: 0.48 K/UL (ref 0–1.3)
MONOCYTES NFR BLD: 6 %
NEUTROPHILS NFR BLD: 82 %
NEUTS SEG NFR BLD: 6.16 K/UL (ref 1.7–7.7)
PLATELET # BLD AUTO: 120 K/UL (ref 135–450)
PMV BLD AUTO: 10.5 FL
POTASSIUM SERPL-SCNC: 3.8 MMOL/L (ref 3.5–5.1)
PROT SERPL-MCNC: 4.9 G/DL (ref 6.4–8.2)
RBC # BLD AUTO: 3.59 M/UL (ref 4–5.2)
SERVICE CMNT-IMP: NORMAL
SERVICE CMNT-IMP: NORMAL
SODIUM SERPL-SCNC: 141 MMOL/L (ref 136–145)
SPECIMEN DESCRIPTION: NORMAL
SPECIMEN DESCRIPTION: NORMAL
WBC OTHER # BLD: 7.5 K/UL (ref 4–11)

## 2024-04-30 PROCEDURE — 97110 THERAPEUTIC EXERCISES: CPT

## 2024-04-30 PROCEDURE — 94640 AIRWAY INHALATION TREATMENT: CPT

## 2024-04-30 PROCEDURE — 2580000003 HC RX 258: Performed by: INTERNAL MEDICINE

## 2024-04-30 PROCEDURE — 80053 COMPREHEN METABOLIC PANEL: CPT

## 2024-04-30 PROCEDURE — 94761 N-INVAS EAR/PLS OXIMETRY MLT: CPT

## 2024-04-30 PROCEDURE — 6370000000 HC RX 637 (ALT 250 FOR IP): Performed by: INTERNAL MEDICINE

## 2024-04-30 PROCEDURE — 2700000000 HC OXYGEN THERAPY PER DAY

## 2024-04-30 PROCEDURE — 97535 SELF CARE MNGMENT TRAINING: CPT

## 2024-04-30 PROCEDURE — 99232 SBSQ HOSP IP/OBS MODERATE 35: CPT | Performed by: INTERNAL MEDICINE

## 2024-04-30 PROCEDURE — 97116 GAIT TRAINING THERAPY: CPT

## 2024-04-30 PROCEDURE — 97530 THERAPEUTIC ACTIVITIES: CPT

## 2024-04-30 PROCEDURE — 85025 COMPLETE CBC W/AUTO DIFF WBC: CPT

## 2024-04-30 PROCEDURE — 6360000002 HC RX W HCPCS: Performed by: INTERNAL MEDICINE

## 2024-04-30 RX ORDER — ASPIRIN 81 MG/1
81 TABLET ORAL DAILY
Qty: 30 TABLET | Refills: 3 | Status: SHIPPED | OUTPATIENT
Start: 2024-05-01

## 2024-04-30 RX ORDER — MIDODRINE HYDROCHLORIDE 5 MG/1
5 TABLET ORAL
Qty: 90 TABLET | Refills: 3 | Status: SHIPPED | OUTPATIENT
Start: 2024-04-30

## 2024-04-30 RX ORDER — AMIODARONE HYDROCHLORIDE 200 MG/1
200 TABLET ORAL 2 TIMES DAILY
Qty: 30 TABLET | Refills: 0
Start: 2024-04-30

## 2024-04-30 RX ORDER — PANTOPRAZOLE SODIUM 40 MG/1
40 TABLET, DELAYED RELEASE ORAL
Qty: 30 TABLET | Refills: 3 | Status: SHIPPED | OUTPATIENT
Start: 2024-05-01

## 2024-04-30 RX ADMIN — ASPIRIN 81 MG: 81 TABLET, COATED ORAL at 08:00

## 2024-04-30 RX ADMIN — AMIODARONE HYDROCHLORIDE 200 MG: 200 TABLET ORAL at 08:01

## 2024-04-30 RX ADMIN — MIDODRINE HYDROCHLORIDE 5 MG: 5 TABLET ORAL at 08:01

## 2024-04-30 RX ADMIN — PANTOPRAZOLE SODIUM 40 MG: 40 TABLET, DELAYED RELEASE ORAL at 06:55

## 2024-04-30 RX ADMIN — APIXABAN 10 MG: 5 TABLET, FILM COATED ORAL at 08:00

## 2024-04-30 RX ADMIN — MIDODRINE HYDROCHLORIDE 5 MG: 5 TABLET ORAL at 13:13

## 2024-04-30 RX ADMIN — LEVALBUTEROL HYDROCHLORIDE 0.63 MG: 0.63 SOLUTION RESPIRATORY (INHALATION) at 12:07

## 2024-04-30 RX ADMIN — LEVALBUTEROL HYDROCHLORIDE 0.63 MG: 0.63 SOLUTION RESPIRATORY (INHALATION) at 08:19

## 2024-04-30 RX ADMIN — Medication 30 ML: at 08:05

## 2024-04-30 ASSESSMENT — PAIN SCALES - GENERAL
PAINLEVEL_OUTOF10: 0

## 2024-04-30 ASSESSMENT — PAIN SCALES - WONG BAKER: WONGBAKER_NUMERICALRESPONSE: NO HURT

## 2024-04-30 NOTE — DISCHARGE SUMMARY
Discharge Summary    Name:  Radha Daley /Age/Sex: 1940 (83 y.o. female)   Admit Date: 2024  Discharge Date: 24    MRN & CSN:  [id number] & [id number] Encounter Date and Time 24    Attending:  Neil Rome MD Discharging Provider: Wenceslao Harvey MD     Hospital Course:   REASON FOR ADMISSION/CHIEF COMPLAINT:   Fall at home, initial encounter    PRINCIPAL DIAGNOSIS* AND HOSPITAL PROBLEM LIST:  General weakness [R53.1]  Elevated troponin [R79.89]  Elevated CK [R74.8]  Acute lower UTI [N39.0]  Frequent falls [R29.6]  Fall at home, initial encounter [W19.XXXA, Y92.009]  Atrial fibrillation, unspecified type (HCC) [I48.91]    CONSULTS DURING THE ADMISSION:  IP CONSULT TO PULMONOLOGY  IP CONSULT TO UROLOGY  IP CONSULT TO SOCIAL WORK  IP CONSULT TO CARDIOLOGY    BRIEF HPI & SUMMARY OF HOSPITAL COURSE:   Radha Daley is a 83 y.o.  female with medical history including hypertension, hyperlipidemia, nicotine dependence and tobacco smoking came to the emergency room complaining of generalized weakness.  Patient apparently was not able to get up from the toilet seat and gradually slid herself down and was unable to get up from the floor.  Reports episodes of falls recently.  Admitted for further evaluation and management. Please review H&P for full details. In summary, during the course of hospitalization patient was found to have following problems including  Abnormal UA, suspected asymptomatic bacteriuria: Urine culture with normal trish. Patient was treated empirically with IV antibiotics and completed course.  Hypoxia suspected chronic with underlying COPD: Patient also had new requirement of supplemental oxygen.  CT chest PE done, noted as below. Long term tobacco smoking, currently smokes about 10 cig per day. Advised to quit. Recommended outpt f/u with pulmonology team  Suprarenal aortic thrombus: Incidental finding of suprarenal aortic thrombus.  Subsequent CT abdomen and

## 2024-04-30 NOTE — PROGRESS NOTES
Hospitalist Progress Note      Name:  Radha Daley /Age/Sex: 1940  (83 y.o. female)   MRN & CSN:  [id number] & [id number] Admission Date/Time: 2024  3:52 PM   Location:  2611/261-01 PCP: Nisreen Tong MD         Hospital Day: 4    Assessment and Plan:   Radha Daley is a 83 y.o.  female with past medical history of hypertension, hyperlipidemia, nicotine dependence, ? COPD came to the emergency room complaining of generalized weakness.  Patient apparently was not able to get up from the toilet seat and gradually slid herself down and was unable to get up from the floor.  Patient apparently laid on the floor for about 4 hours.  Denies any dizziness or loss of consciousness.  Reports episodes of falls recently.  Reports right hip pain.  Initial urinalysis noted to be abnormal, patient was started on IV antibiotic.  Patient also had new requirement of supplemental oxygen.  CT chest PE done, noted as below.  Incidental finding of suprarenal aortic thrombus.  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  Findings were discussed with vascular surgery who recommended no surgical intervention.  Recommended anticoagulation.  Patient was started on IV heparin gtt.  On the night of 2024, patient was noted to have become hypotensive with MAP less than 65.  Patient required pressor support.  Transferred to ICU.  Lactate elevated, troponin level also elevated.  Echocardiogram ordered as below.  Critical care/pulmonary was consulted.  On 2024, patient was noted to have developed new onset atrial fibrillation.       CT head  1. No acute intracranial hemorrhage.  2. Age-related cortical atrophy.     X-ray right hip  No acute bony abnormality     X-ray chest  No acute findings    ECHO   *Left ventricle - normal size, thickness, function EF 60%   *Tricuspid valve - moderate regurgitation 
  [facility] - Rehabilitation Department      Physical Therapy  2611/2611-01  [facility] 3 PROGRESSIVE CARE    [] Initial Evaluation            [x] Daily Treatment Note         [] Discharge Summary      Patient: Radha Daley   : 1940   MRN: [id number]   Date of Service:  2024   Admitting Diagnosis: Fall at home, initial encounter  Ordering Physician: Neil Rome MD  Current Admission Summary:   Per Dr. Rome Note 24  \"Radha Daley is a 83 y.o.  female with past medical history of hypertension, hyperlipidemia, came to the emergency room complaining of generalized weakness.  Patient apparently was not able to get up from the toilet seat and gradually slid herself down and was unable to get up from the floor.  Patient apparently laid on the floor for about 4 hours.  Denies any dizziness or loss of consciousness.  Reports episodes of falls recently.  Reports right hip pain.  .  Upon arrival in the emergency room, WBC 13.6, hemoglobin/hematocrit 15.6/48.3, platelet 170, troponin elevated at 36, trended down to 34.  Sodium 139, potassium 3.6, BUN/creatinine 20/0.9.  CK level elevated at 349.  Urinalysis suggestive of UTI.  Initial EKG, reported as atrial fibrillation however appears to be sinus rhythm with PACs.  No acute ST-T changes.     CT head  1. No acute intracranial hemorrhage.  2. Age-related cortical atrophy.     X-ray right hip  No acute bony abnormality     X-ray chest  No acute findings     Assessment     Status post fall  Abnormal urinalysis suggestive of UTI  Elevated CK  Generalized weakness/debility  Elevated troponin  Sinus arrhythmia  Hypertension  Hyperlipidemia     Plan     Telemetry monitoring  Continue IV hydration  Initial EKG was reported as atrial fibrillation, rate controlled however, appears to be sinus rhythm with PACs.  Will repeat EKG now.  Initial troponin noted to be elevated, trended down, obtain echocardiogram  Urinalysis suggestive for UTI.  Start 
  [facility] - Rehabilitation Department      Physical Therapy  3224/3224-01  [facility] 3 PROGRESSIVE CARE    [] Initial Evaluation            [] Daily Treatment Note         [] Discharge Summary      Patient: Radha Daley   : 1940   MRN: [id number]   Date of Service:  2024   Admitting Diagnosis: Fall at home, initial encounter  Ordering Physician: Neil Rome MD  Current Admission Summary:   Per Dr. Rome Note 24  \"Radha Daley is a 83 y.o.  female with past medical history of hypertension, hyperlipidemia, came to the emergency room complaining of generalized weakness.  Patient apparently was not able to get up from the toilet seat and gradually slid herself down and was unable to get up from the floor.  Patient apparently laid on the floor for about 4 hours.  Denies any dizziness or loss of consciousness.  Reports episodes of falls recently.  Reports right hip pain.  .  Upon arrival in the emergency room, WBC 13.6, hemoglobin/hematocrit 15.6/48.3, platelet 170, troponin elevated at 36, trended down to 34.  Sodium 139, potassium 3.6, BUN/creatinine 20/0.9.  CK level elevated at 349.  Urinalysis suggestive of UTI.  Initial EKG, reported as atrial fibrillation however appears to be sinus rhythm with PACs.  No acute ST-T changes.     CT head  1. No acute intracranial hemorrhage.  2. Age-related cortical atrophy.     X-ray right hip  No acute bony abnormality     X-ray chest  No acute findings     Assessment     Status post fall  Abnormal urinalysis suggestive of UTI  Elevated CK  Generalized weakness/debility  Elevated troponin  Sinus arrhythmia  Hypertension  Hyperlipidemia     Plan     Telemetry monitoring  Continue IV hydration  Initial EKG was reported as atrial fibrillation, rate controlled however, appears to be sinus rhythm with PACs.  Will repeat EKG now.  Initial troponin noted to be elevated, trended down, obtain echocardiogram  Urinalysis suggestive for UTI.  Start 
  [facility] Daily Progress Note      Admit Date:  4/25/2024    Chief Complaint   Patient presents with    Fall     Fall, down for approximately 4 hours, no injuries        Subjective:  Ms. Daley overnight went into Afib with RVR. Asymptomatic. -120.    Objective:   BP (!) 82/56   Pulse 98   Temp 98 °F (36.7 °C) (Oral)   Resp 27   Ht 1.473 m (4' 10\")   Wt 66 kg (145 lb 8.1 oz)   SpO2 93%   BMI 30.41 kg/m²     Intake/Output Summary (Last 24 hours) at 4/28/2024 1013  Last data filed at 4/28/2024 0837  Gross per 24 hour   Intake 1600.99 ml   Output 1250 ml   Net 350.99 ml       TELEMETRY: Atrial fibrillation     Physical Exam:  General:  Awake, alert, oriented x 3, NAD  Skin:  Warm and dry  Neck:  JVD flat  Chest:  rhonchi  Cardiovascular:  RRR S1S2, no S3, no mrmr  Abdomen:  Soft, ND, NT, No HSM  Extremities:  1+ edema    Medications:    magnesium sulfate  1,000 mg IntraVENous Once    calcium gluconate  1,000 mg IntraVENous Once    levalbuterol  0.63 mg Nebulization Q4H While awake    piperacillin-tazobactam  3,375 mg IntraVENous Q8H    lidocaine 1 % injection  5 mL IntraDERmal Once    sodium chloride flush  5-40 mL IntraVENous 2 times per day    atorvastatin  40 mg Oral Nightly    [Held by provider] propranolol  120 mg Oral Daily    pantoprazole  40 mg Oral QAM AC    aspirin  81 mg Oral Daily    [Held by provider] losartan  50 mg Oral Daily      amiodarone      Followed by    amiodarone      heparin (PORCINE) Infusion 14 Units/kg/hr (04/27/24 1817)    sodium chloride      norepinephrine 1 mcg/min (04/28/24 0632)    sodium chloride 5 mL/hr at 04/27/24 2209     heparin (porcine), heparin (porcine), sodium chloride flush, sodium chloride, sodium chloride, potassium chloride **OR** potassium alternative oral replacement **OR** potassium chloride, magnesium sulfate, ondansetron **OR** ondansetron, polyethylene glycol, acetaminophen **OR** acetaminophen, perflutren lipid microspheres    Lab 
  Piperacillin-Tazobactam Extended Interval Interchange    The following ordered dose of Piperacillin-Tazobactam has been changed to optimize its pharmacodynamic profile as approved by the Patient Care Review Committee.    Ordered Dose  3.375 gm IV q6 or 8hrs (30 minute infusion)    New Dose    CrCl  ? 20ml/min    3.375gm IV q8hrs  (4-hour infusion)      Pharmacists should be contacted for issues concerning drug compatibility with multiple IV medications.  All doses will be prepared using 50ml D5W to be infused over 4-hours at a rate of 12.5ml/hr.    Tali Harper PharmD, [facility]  [id number]  
[phone] called for PICC line access.   
4 Eyes Skin Assessment     NAME:  Radha Daley  YOB: 1940  MEDICAL RECORD NUMBER:  [id number]    The patient is being assessed for  Admission    I agree that at least one RN has performed a thorough Head to Toe Skin Assessment on the patient. ALL assessment sites listed below have been assessed.      Areas assessed by both nurses:    Head, Face, Ears, Shoulders, Back, Chest, Arms, Elbows, Hands, Sacrum. Buttock, Coccyx, Ischium, Legs. Feet and Heels, and Under Medical Devices         Does the Patient have a Wound? Yes wound(s) were present on assessment. LDA wound assessment was Initiated and completed by RN       Elijah Prevention initiated by RN: Yes  Wound Care Orders initiated by RN: YES    Pressure Injury (Stage 3,4, Unstageable, DTI, NWPT, and Complex wounds) if present, place Wound referral order by RN under : No    New Ostomies, if present place, Ostomy referral order under : No     Nurse 1 eSignature: Electronically signed by Emma Baig RN on 4/25/24 at 7:44 PM EDT    **SHARE this note so that the co-signing nurse can place an eSignature**    Nurse 2 eSignature: Electronically signed by Ana Ordonez RN on 4/26/24 at 12:30 AM EDT     
4 Eyes Skin Assessment     NAME:  Radha Daley  YOB: 1940  MEDICAL RECORD NUMBER:  [id number]    The patient is being assessed for  Transfer to New Unit    I agree that at least one RN has performed a thorough Head to Toe Skin Assessment on the patient. ALL assessment sites listed below have been assessed.      Areas assessed by both nurses:    Head, Face, Ears, Shoulders, Back, Chest, Arms, Elbows, Hands, Sacrum. Buttock, Coccyx, Ischium, Legs. Feet and Heels, and Under Medical Devices         Does the Patient have a Wound? Yes wound(s) were present on assessment. LDA wound assessment was Initiated and completed by RN       Elijah Prevention initiated by RN: Yes  Wound Care Orders initiated by RN: No    Pressure Injury (Stage 3,4, Unstageable, DTI, NWPT, and Complex wounds) if present, place Wound referral order by RN under : No    New Ostomies, if present place, Ostomy referral order under : No     Nurse 1 eSignature: Electronically signed by Alexandra Huntley RN on 4/26/24 at 11:16 PM EDT    **SHARE this note so that the co-signing nurse can place an eSignature**    Nurse 2 eSignature: Electronically signed by Km Shrestha RN on 4/27/24 at 3:22 AM EDT    
Arrived to place PICC line with bedside RN Leola. Pre-procedure and timeout done with RN, discussed limitations of placement and allergies. Consent confirmed. Vital signs stable. Labs, allergies, medications, and code status reviewed. No contraindications noted.    Procedure explained to pt, including the risk and benefits of the procedure. All questions answered. Pt verbalizes understanding of the procedure and states no more questions.     Pt's basilic, brachial, cephalic are all easily collapsible with no indication for a clot. Vein selected is large enough for catheter. Pt tolerated sterile procedure well, with no difficulty accessing basilic vein, when accessed - blood was free flowing and non-pulsatile. Guidewire, introducer, and catheter went in smoothly.     PICC line verified with ECG  PICC is verified:  Please replace all existing IV tubing with new IV tubing prior to using the PICC for current IV infusions.  Please remove any PIVs from PICC arm.  All of the above may be sources of infection or an increase chance of a clot.      Post procedure - reorganized pt table, placed pt in lowest position, with call light and educated on line care. Instructed pt/RN not to use arm for at least 30min to avoid bleeding. Reported off to bedside RN.        [phone]      
CC: Hypoxemia    No active complaints this morning.  Has not been out of bed yet today.  Receiving nebulized bronchodilator, without noticeable change.  Afebrile  WBC 7.5  Empiric antibiotic therapy with ceftriaxone changed to piperacillin-tazobactam, completed treatment.  BP 93/59, on midodrine 5 mg.  NSR  Heart sounds diminished  O2 saturation 90-93%, on O2 at 2 L/min  Breath sounds moderately decreased, without adventitious breath sounds  Abdomen benign.  No peripheral edema.  Fluid balance neutral past 24 hours  Creatinine 0.8.  Electrolytes normal.    A&P: Progressive weakness may be multifactorial.  She has right ventricular dilation, and likely some element of failure although does not have signs of cor pulmonale systemically.  This is likely secondary to chronic obstructive lung disease.  Hypoxemia is not severe but likely a contributing problem.  Hypotension has been bolstered with midodrine.  Question of bladder mass will need further assessment with cystoscopy.  She clearly will need placement in SNF.    
Cardiology engaged regarding transition from Heparin drip and Amiodarone drip to PO forms of meds.    Recommendations per Dr. Ness with Cardiology 200 mg BID Amio and Eliquis at discharge. Follow up with Dr. Ness.     
Comprehensive Nutrition Assessment    Type and Reason for Visit:  Positive Nutrition Screen    Nutrition Recommendations/Plan:   Continue Cardiac 2gm Na diet.  Continue Ensure TID.     Malnutrition Assessment:  Malnutrition Status:  Moderate malnutrition (04/26/24 1124)    Context:  Chronic Illness     Findings of the 6 clinical characteristics of malnutrition:  Energy Intake:  Mild decrease in energy intake (Comment)  Weight Loss:  No significant weight loss     Body Fat Loss:  Severe body fat loss Buccal region, Orbital   Muscle Mass Loss:  Mild muscle mass loss Temples (temporalis)  Fluid Accumulation:  No significant fluid accumulation     Strength:  Not Performed    Nutrition Assessment:    + nutrition screen for wounds. Pt with PMH of HTN and HLD who presented with weakness. Pt with increased nutrition needs 2/2 stg 2 PU. Pt reports she ate bites of each items on her breakfast tray this AM. States she normally isn't a big eater. Ensure at bedside, when asked about it, she reports she has never had it and we can just get rid of it. Provided rationale for needing supplement, encouraged to try and pt willing to try. WIll leave ONS on board at this time. No recent wt loss noted, pt reports -150#. WIll continue current nutrition interventions at this time.    Nutrition Related Findings:    no edema or BM noted; Labs reviewed Wound Type: Pressure Injury, Stage II       Current Nutrition Intake & Therapies:    Average Meal Intake: 26-50%  Average Supplements Intake: Unable to assess (rod dodge)  ADULT DIET; Regular; Low Fat/Low Chol/High Fiber/2 gm Na  ADULT ORAL NUTRITION SUPPLEMENT; Breakfast, Lunch, Dinner; Standard High Calorie/High Protein Oral Supplement    Anthropometric Measures:  Height: 147.3 cm (4' 10\")  Ideal Body Weight (IBW): 90 lbs (41 kg)       Current Body Weight: 63.9 kg (140 lb 14 oz), 156.5 % IBW. Weight Source: Not Specified  Current BMI (kg/m2): 29.5                          BMI 
No UOP since zapata removed. Bladder scan with 211 mL. Will repeat renal and lactate to ensure perfusion still adequate although MAP >65. If any abnormalities, will given fluid bolus. No issues with oral intake at present.  
Occupational Therapy    [facility] - Rehabilitation Department       Occupational Therapy  2611/2611-01  [facility] 3 PROGRESSIVE CARE    [] Initial Evaluation            [x] Daily Treatment Note         [] Discharge Summary      Patient: Radha Daley   : 1940   MRN: [id number]   Date of Service:  2024    Admitting Diagnosis:  Fall at home, initial encounter  Referring Physician: Marissa Banks RN   Current Admission Summary: Pt is a 83 y.o. F with CC of generalized weakness, with controlled fall from toilet seat resulting in patient laying on floor for four house unable to get up. Pt reports history recent falls and c/o R hip pain. No c/o dizziness/LOC.    Urinalysis suggestive of UTI. CT head (-) acute. R hip XR (-). Chest XR (-). \"Initial EKG was reported as atrial fibrillation, rate controlled however, appears to be sinus rhythm with PACs. Will repeat EKG now. Initial troponin noted to be elevated, trended down, obtain echocardiogram.\"     Update::The patient is a 83 y.o. female with significant past medical history of breast cancer (history is limited as patient does not regularly obtain medical assistance) presented to the ER with generalized weakness on . She mentions that over the past few weeks she's been getting weaker and weaker. She was unable to get up from the commode and slid herself down to the floor as she was unable to pull herself up as she normally does. She was taken to the ER and admitted where she was noted to have UTI. CT of her head was unremarkable as was her XR of hip and chest.      She did have further scans of her abdomen which revealed an incidental eccentric suprarenal left lateral aortic soft plaque which has a 4 mm free-floating component for which she was started on heparin.      On the night of 2024, patient was noted to have become hypotensive with MAP less than 65.  Patient required pressor support.  Transferred to ICU.  Lactate elevated, 
Occupational Therapy    [facility] - Rehabilitation Department       Occupational Therapy  2611/2611-01  [facility] 3 PROGRESSIVE CARE    [] Initial Evaluation            [x] Daily Treatment Note         [] Discharge Summary      Patient: Radha Daley   : 1940   MRN: [id number]   Date of Service:  2024    Admitting Diagnosis:  Fall at home, initial encounter  Referring Physician: Marissa Banks RN   Current Admission Summary: Pt is a 83 y.o. F with CC of generalized weakness, with controlled fall from toilet seat resulting in patient laying on floor for four house unable to get up. Pt reports history recent falls and c/o R hip pain. No c/o dizziness/LOC.    Urinalysis suggestive of UTI. CT head (-) acute. R hip XR (-). Chest XR (-). \"Initial EKG was reported as atrial fibrillation, rate controlled however, appears to be sinus rhythm with PACs. Will repeat EKG now. Initial troponin noted to be elevated, trended down, obtain echocardiogram.\"     Update::The patient is a 83 y.o. female with significant past medical history of breast cancer (history is limited as patient does not regularly obtain medical assistance) presented to the ER with generalized weakness on . She mentions that over the past few weeks she's been getting weaker and weaker. She was unable to get up from the commode and slid herself down to the floor as she was unable to pull herself up as she normally does. She was taken to the ER and admitted where she was noted to have UTI. CT of her head was unremarkable as was her XR of hip and chest.      She did have further scans of her abdomen which revealed an incidental eccentric suprarenal left lateral aortic soft plaque which has a 4 mm free-floating component for which she was started on heparin.      On the night of 2024, patient was noted to have become hypotensive with MAP less than 65.  Patient required pressor support.  Transferred to ICU.  Lactate elevated, 
Occupational Therapy    [facility] - Rehabilitation Department       Occupational Therapy  2611/2611-01  [facility] 3 PROGRESSIVE CARE    [] Initial Evaluation            [x] Daily Treatment Note         [] Discharge Summary      Patient: Radha Daley   : 1940   MRN: [id number]   Date of Service:  2024    Admitting Diagnosis:  Fall at home, initial encounter  Referring Physician: Marissa Banks RN   Current Admission Summary: Pt is a 83 y.o. F with CC of generalized weakness, with controlled fall from toilet seat resulting in patient laying on floor for four house unable to get up. Pt reports history recent falls and c/o R hip pain. No c/o dizziness/LOC.    Urinalysis suggestive of UTI. CT head (-) acute. R hip XR (-). Chest XR (-). \"Initial EKG was reported as atrial fibrillation, rate controlled however, appears to be sinus rhythm with PACs. Will repeat EKG now. Initial troponin noted to be elevated, trended down, obtain echocardiogram.\"     Update::The patient is a 83 y.o. female with significant past medical history of breast cancer (history is limited as patient does not regularly obtain medical assistance) presented to the ER with generalized weakness on . She mentions that over the past few weeks she's been getting weaker and weaker. She was unable to get up from the commode and slid herself down to the floor as she was unable to pull herself up as she normally does. She was taken to the ER and admitted where she was noted to have UTI. CT of her head was unremarkable as was her XR of hip and chest.      She did have further scans of her abdomen which revealed an incidental eccentric suprarenal left lateral aortic soft plaque which has a 4 mm free-floating component for which she was started on heparin.      On the night of 2024, patient was noted to have become hypotensive with MAP less than 65.  Patient required pressor support.  Transferred to ICU.  Lactate elevated, 
PICC team RN at bedside placing PICC line.   
PICC team contacted for placement.   
Patient was unable to void overnight, bladder scanned at 412ml; straight cath performed with 500ml out. Patient tolerated well. Heidy care provided. Purewick in place. Patient is A/O x4 with call light in reach. Bed alarm on, yellow socks on. Patient reminded and assisted to turn overnight, pillows given for comfort. Patient given PO fluids overnight. No complaints of pain. No further needs expressed at this time.     Electronically signed by Ana Ordonez RN on 4/26/2024 at 5:52 AM   
Pt assisted back to bed from chair with georgi steady. Tolerated well.    
Pulmonary and Critical Care    Follow Up Note    Subjective:   CHIEF COMPLAINT / HPI:   Chief Complaint   Patient presents with    Fall     Fall, down for approximately 4 hours, no injuries   The patient is a 83 y.o. female with significant past medical history of breast cancer (history is limited as patient does not regularly obtain medical assistance) presented to the ER with generalized weakness on 4/25. She mentions that over the past few weeks she's been getting weaker and weaker. She was unable to get up from the commode and slid herself down to the floor as she was unable to pull herself up as she normally does. She was taken to the ER and admitted where she was noted to have UTI. CT of her head was unremarkable as was her XR of hip and chest.      She did have further scans of her abdomen which revealed an incidental eccentric suprarenal left lateral aortic soft plaque which has a 4 mm free-floating component for which she was started on heparin.     Pulmonology has been consulted as the patient requires 2 L/min of oxygen to maintain O2 sat above 90% which is new for her. She says she's been told in the past that she has COPD although I am unable to find this in her chart.    Interval history:  Now down to 1 mcg/min of levo and 1 L/min of oxygen. Seen this morning sitting up in a chair looking much better than yesterday. Even tone of voice has become stronger. All workup relatively negative. BCx negative after 2 days. Still unclear etiology of hypotension. PICC line placed and CVC in the RIJ has been removed. Patient appears to be doing well. Does have exertional dyspnea after getting into the chair this morning    Past Medical History:    Reviewed; no changes    Social History:    Reviewed; no changes    REVIEW OF SYSTEMS:    Review of Systems   Constitutional: Negative.    HENT: Negative.     Eyes: Negative.    Respiratory: Negative.  Negative for cough, chest tightness, shortness of breath, wheezing and 
Pulmonary and Critical Care    Follow Up Note    Subjective:   CHIEF COMPLAINT / HPI:   Chief Complaint   Patient presents with    Fall     Fall, down for approximately 4 hours, no injuries   The patient is a 83 y.o. female with significant past medical history of breast cancer (history is limited as patient does not regularly obtain medical assistance) presented to the ER with generalized weakness on 4/25. She mentions that over the past few weeks she's been getting weaker and weaker. She was unable to get up from the commode and slid herself down to the floor as she was unable to pull herself up as she normally does. She was taken to the ER and admitted where she was noted to have UTI. CT of her head was unremarkable as was her XR of hip and chest.      She did have further scans of her abdomen which revealed an incidental eccentric suprarenal left lateral aortic soft plaque which has a 4 mm free-floating component for which she was started on heparin.     Pulmonology has been consulted as the patient requires 2 L/min of oxygen to maintain O2 sat above 90% which is new for her. She says she's been told in the past that she has COPD although I am unable to find this in her chart.    Interval history:  Overnight became hypotensive requiring a central line to the RIJ, transfer to ICU and levophed up to 6 to maintain a MAP >65. Patient states she slept great however and feels good this morning aside from body aches. Alert and oriented and denies fevers/chills.     Past Medical History:    Reviewed; no changes    Social History:    Reviewed; no changes    REVIEW OF SYSTEMS:    Review of Systems   HENT: Negative.     Eyes: Negative.    Respiratory:  Negative for cough, chest tightness, shortness of breath, wheezing and stridor.    Cardiovascular:  Positive for leg swelling. Negative for chest pain and palpitations.   Gastrointestinal: Negative.  Negative for abdominal pain, diarrhea, nausea and vomiting.   Endocrine: 
Pulmonary and Critical Care    Follow Up Note    Subjective:   CHIEF COMPLAINT / HPI:   Chief Complaint   Patient presents with    Fall     Fall, down for approximately 4 hours, no injuries   The patient is a 83 y.o. female with significant past medical history of breast cancer (history is limited as patient does not regularly obtain medical assistance) presented to the ER with generalized weakness on 4/25. She mentions that over the past few weeks she's been getting weaker and weaker. She was unable to get up from the commode and slid herself down to the floor as she was unable to pull herself up as she normally does. She was taken to the ER and admitted where she was noted to have UTI. CT of her head was unremarkable as was her XR of hip and chest.      She did have further scans of her abdomen which revealed an incidental eccentric suprarenal left lateral aortic soft plaque which has a 4 mm free-floating component for which she was started on heparin.     Pulmonology has been consulted as the patient requires 2 L/min of oxygen to maintain O2 sat above 90% which is new for her. She says she's been told in the past that she has COPD although I am unable to find this in her chart.    Interval history:  Remains on 1 L of oxygen this morning. Off of levophed thanks to midodrine. Doing well. No acute events overnight. On amiodarone gtt, heparin gtt and abx at present. Need to consider transitioning to orals    Past Medical History:    Reviewed; no changes    Social History:    Reviewed; no changes    REVIEW OF SYSTEMS:    Review of Systems   Constitutional: Negative.    HENT: Negative.     Eyes: Negative.    Respiratory: Negative.  Negative for cough, chest tightness, shortness of breath, wheezing and stridor.    Cardiovascular:  Negative for chest pain, palpitations and leg swelling.   Gastrointestinal: Negative.  Negative for abdominal pain, diarrhea, nausea and vomiting.   Endocrine: Negative.    Genitourinary: 
Report called to [facility].  Pt's son notified of  time.   
Spoke to POA to update patient being transferred to ICU. All questions answer.  
Vasc Prelim     Negative for DVT prelim in results review  
Extremity     Required Braces/Orthotics: no braces required   [] Right  [] Left  Positional Restrictions:no positional restrictions     Pre-Admission Information   Lives With: alone    Type of Home: condo  Home Layout: two level, 1/2 bath on main level, 12 stairs to 2nd level with left sided HR, can sleep on the first floor  Home Access: one-two (depending on how she enters) step to enter without rails   Bathroom Layout: tub/shower unit, walk in shower  Bathroom Equipment: shower chair  Toilet Height: standard height  Home Equipment: quad cane  Transfer Assistance: modified independent with use of quad cane  Ambulation Assistance:modified independent with use of quad cane  ADL Assistance: independent with all ADL's  IADL Assistance:  Family assists with most IADLs  Active :        [] Yes  [x] No (since the holidays)   Comment:  Pt sponge bathes on the first floor and sleeps on the first floor. She is working on getting a stair lift.     Recent Falls: Two in the past year. Pt states one fall she just \"went down\" while walking.     Examination     Vision:   Vision Gross Assessment: WFL - recent cataract surgery completed and no longer needs her glasses   Hearing:   WFL    Observation:   Pt presents supine in bed upon arrival.   Sensation:   WFL to light touch extremities x4    Additional Neurological Testing:    ROM:  LUE:Shoulder AROM ~ 90 degrees, PROM ~ 110 degrees, elbow/wrist/digits AROM WFL   RUE: Shoulder AROM ~ 90 degrees, PROM ~ 110 degrees, elbow/wrist/digits AROM WFL  Strength:  LUE: WFL    RUE: WFL      Therapist Clinical Decision Making (Complexity): medium complexity  Clinical Presentation: stable      Subjective    General: Pt agreeable and cooperative this date for evaluation/treatment. Pt expresses feeling weaker than baseline.     Pain: 0/10  Pain Interventions: not applicable       Vitals:  Pt Position BP (mmHg) HR (bpm) SpO2 (%)  Comments   Supine 99/65 ([phone] 93 On 1.5L NC   Sitting EOB 
SCDs, [] Ambulation   GI Prophylaxis [] PPI,  [] H2 Blocker,  [] Carafate,  [] Diet/Tube Feeds   Code Status DNR-CCA   Disposition Patient requires continued admission due to PT/OT eval/IV fluids/IV antibiotic   MDM [] Low, [] Moderate,[x]  High  Patient's risk as above      History of Present Illness:     Chief Complaint: Fall at home, initial encounter    Patient seen and examined at bedside.  Still requiring supplemental oxygen via nasal cannula.  Reports of diffuse abdominal pain.  No nausea or vomiting.  Remains afebrile.  Patient also reported exertional shortness of breath, ongoing since past 1 year.  Also reports her abdominal pain has been ongoing since past 2 to 3 weeks.    Objective:     Intake/Output Summary (Last 24 hours) at 4/26/2024 0826  Last data filed at 4/26/2024 0511  Gross per 24 hour   Intake 499.98 ml   Output 500 ml   Net -0.02 ml      Vitals:   Vitals:    04/26/24 0730   BP: 105/66   Pulse: 66   Resp: 18   Temp: 98.4 °F (36.9 °C)   SpO2: 95%     Physical Exam:   General-no acute distress  CVS-S1-S2 RRR  Respiratory-bilateral air entry fair.  No obvious wheezing.  Reduced air entry at the bases  Abdomen-soft, mild diffuse tenderness, no guarding.  Nondistended  CNS-AOx3, no focal deficits  Medications:   Medications:    sodium chloride flush  5-40 mL IntraVENous 2 times per day    cefTRIAXone (ROCEPHIN) IV  1,000 mg IntraVENous Q24H    atorvastatin  40 mg Oral Nightly    propranolol  120 mg Oral Daily    pantoprazole  40 mg Oral QAM AC    aspirin  81 mg Oral Daily    losartan  50 mg Oral Daily      Infusions:    sodium chloride      sodium chloride 100 mL/hr at 04/26/24 0507     PRN Meds: sodium chloride flush, 5-40 mL, PRN  sodium chloride, , PRN  potassium chloride, 40 mEq, PRN   Or  potassium alternative oral replacement, 40 mEq, PRN   Or  potassium chloride, 10 mEq, PRN  magnesium sulfate, 2,000 mg, PRN  ondansetron, 4 mg, Q8H PRN   Or  ondansetron, 4 mg, Q6H PRN  polyethylene glycol, 17 
monitoring  Continue pressor support with Levophed.  Wean as able  Started on Midodrine  Holding propranolol/losartan  Urinalysis suggestive for UTI.  However, urine culture shows mixed normal trish.  Blood cultures x 2 sent.  No growth till date.  Repeat x-ray chest 4/27/2024 shows bibasilar atelectatic changes.  Patient started on IV Zosyn.  IV ceftriaxone discontinued.  Patient had total of 5 days of antibiotics, culture data remain negative.  Will discontinue antibiotics.  Pulmonary/critical care evaluation.  Case discussed.  D-dimer noted to be elevated, subsequent CTA chest PE findings noted as above.  Discussed findings with vascular surgery Dr. Gomez over the phone on 4/26/2024.  Recommended CTA abdomen/pelvis to rule out any mesenteric ischemia.  This was also noted as above, no intervention, recommend anticoagulation x 3 months and then rescan.  Outpatient follow-up with vascular surgery.  Started on IV heparin.  .  Bilateral lower extremity duplex shows no acute DVT.  However, patient noted to be in atrial fibrillation, new onset.  [id number] score of 4.  Patient already on IV heparin.  Started on amiodarone gtt. converted to normal sinus rhythm  Echocardiogram noted as above.    Continue IV hydration  Currently requiring supplemental oxygen with nasal cannula 2 L/min.  Patient reports that she was diagnosed with COPD in the past but does not take any inhalers.  Unfortunately, she continues to smoke about 10 cigarettes/day.  Refuses nicotine patch.  Continue DuoNeb   Possible bladder tumor, urology evaluation appreciated.  Recommend follow-up as outpatient for cystoscopy.  PT/OT eval  Continue pravastatin/aspirin  Continue PPI    Spoke to patient's Trae BLAND ([phone]) over the phone.  Updated about current treatment plan.  Answered all questions.    Patient revoked her DNR CCA status.  Patient is full code now    Diet ADULT DIET; Regular; Low Fat/Low Chol/High Fiber/2 gm Na  ADULT ORAL 
supplemental oxygen via nasal cannula.  Denies abdominal pain, nausea or vomiting.  Remains afebrile.  Feeling lethargic .     Objective:     Intake/Output Summary (Last 24 hours) at 4/27/2024 0706  Last data filed at 4/27/2024 0613  Gross per 24 hour   Intake 4233.42 ml   Output 900 ml   Net 3333.42 ml        Vitals:   Vitals:    04/27/24 0630   BP: 126/65   Pulse: 68   Resp: 23   Temp:    SpO2: 98%     Physical Exam:   General-no acute distress  CVS-S1-S2 RRR  Respiratory-bilateral air entry fair.   Abdomen-soft, nontender, no guarding.  Nondistended  CNS-AOx3, no focal deficits  Medications:   Medications:    ipratropium 0.5 mg-albuterol 2.5 mg  1 Dose Inhalation Q4H WA RT    lidocaine 1 % injection  5 mL IntraDERmal Once    sodium chloride flush  5-40 mL IntraVENous 2 times per day    piperacillin-tazobactam  3,375 mg IntraVENous Q6H    sodium chloride flush  5-40 mL IntraVENous 2 times per day    atorvastatin  40 mg Oral Nightly    [Held by provider] propranolol  120 mg Oral Daily    pantoprazole  40 mg Oral QAM AC    aspirin  81 mg Oral Daily    [Held by provider] losartan  50 mg Oral Daily      Infusions:    heparin (PORCINE) Infusion 16 Units/kg/hr (04/27/24 0059)    sodium chloride      norepinephrine 3 mcg/min (04/27/24 0613)    sodium chloride 10 mL/hr at 04/26/24 2018     PRN Meds: heparin (porcine), 80 Units/kg, PRN  heparin (porcine), 40 Units/kg, PRN  sodium chloride flush, 5-40 mL, PRN  sodium chloride, 25 mL, PRN  sodium chloride flush, 5-40 mL, PRN  sodium chloride, , PRN  potassium chloride, 40 mEq, PRN   Or  potassium alternative oral replacement, 40 mEq, PRN   Or  potassium chloride, 10 mEq, PRN  magnesium sulfate, 2,000 mg, PRN  ondansetron, 4 mg, Q8H PRN   Or  ondansetron, 4 mg, Q6H PRN  polyethylene glycol, 17 g, Daily PRN  acetaminophen, 650 mg, Q6H PRN   Or  acetaminophen, 650 mg, Q6H PRN  perflutren lipid microspheres, 1.5 mL, ONCE PRN          Electronically signed by Neil Rome MD on 
cues for safety     Ambulation on Level surfaces :   Assistance required moderate assistance, with Assistive Device x2 A  Assistive Device rolling walker  Other Appliance Not applicable  Distance in feet : 6ft to chair  Gait Mechanics: Slow anton, B decreased step length/height, narrow KAPIL   Comments:   verbal cues for sequencing, verbal/Tactile cues for hand placement, verbal/Tactile cues for technique verbal cues for safety Heavy manual cues for walker management; pt de- sated to low 80s w/ mobility from bed to chair (see vitals chart above)  Stair Mobility:  Stair mobility not completed on this date.  Comments:     Wheelchair Mobility:  No w/c mobility completed on this date.  Comments:       Balance  Static Sitting:   Pt requires SBA to maintain   Dynamic Sitting:   Pt requires SBA to maintain w/ UE support on rail   Comments:     Static Standing:  Pt requires CGA to maintain w/ RW  Dynamic Standing:   Pt requires   Mod A  to maintain w/ RW  Comments:         Disease Specific Education for:    [x]Pursed Lip Breathing  [x]Deep Breathing Excercises with emphasis on:  chest wall expansion, Upright posture to optimize air exchange and Diaphragmatic Breathing.       Other Therapeutic Interventions    Functional Outcomes  AM-PAC Inpatient Mobility Raw Score : 13              Cognition  Overall Cognitive Status: WFL  Orientation:    alert and oriented x 4  Command Following:   WFL    Education  Barriers To Learning: none and physical  Patient Education: patient educated on plan of care, discharge recommendations, goals, PT role and benefits, general safety, functional mobility training, proper use of assistive device/equipment, transfer training  Learning Assessment:  patient verbalizes and demonstrates understanding, patient verbalizes understanding, would benefit from continued reinforcement    Patient Disposition at end of session:  patient left in chair, chair alarm in place, call light within reach, patient at 
moderate assistance, with Assistive Device x2 A  Assistive Device rolling walker  Other Appliance Not applicable  Distance in feet : 4 feetx3 bed>BSC>chair>bed.   Gait Mechanics: Slow anton, B decreased step length/height, narrow KAPIL   Comments:   verbal cues for sequencing, verbal/Tactile cues for hand placement, verbal/Tactile cues for technique verbal cues for safety Heavy manual cues for walker management; pt de- sated to low 80s w/ mobility from bsc to chair as patient noted to be holding breath with noted rebound to >90% with deep breaths x2.   Stair Mobility:  Stair mobility not completed on this date.  Comments:     Wheelchair Mobility:  No w/c mobility completed on this date.  Comments:       Balance  Static Sitting:   Pt requires Min A  to SBA  maintain at eob   Dynamic Sitting:   Pt requires SBA to to min assist  maintain w/ UE support on rail   Comments:     Static Standing:  Pt requires Min A  to maintain w/ RW  Dynamic Standing:   Pt requires   Mod A  to maintain w/ RW with up to assist of 2   Comments:         Disease Specific Education for:    [x]Pursed Lip Breathing  [x]Deep Breathing Excercises with emphasis on:  chest wall expansion, Upright posture to optimize air exchange and Diaphragmatic Breathing.   [x] LE warm up exercises with patient completing  heel slides x10 and ankle pumps x10 BLE with cues. Patient on return demo indep with ankle pumps and heel slides x10 BLE.     Other Therapeutic Interventions    Functional Outcomes  AM-PAC Inpatient Mobility Raw Score : 11              Cognition  Overall Cognitive Status: WFL  Orientation:    alert and oriented x 4  Command Following:   WFL    Education  Barriers To Learning: none and physical  Patient Education: patient educated on plan of care, discharge recommendations, goals, PT role and benefits, general safety, functional mobility training, proper use of assistive device/equipment, transfer training  Learning Assessment:  patient verbalizes

## 2024-04-30 NOTE — RT PROTOCOL NOTE
RT Nebulizer Bronchodilator Protocol Note    There is a bronchodilator order in the chart from a provider indicating to follow the RT Bronchodilator Protocol and there is an “Initiate RT Bronchodilator Protocol” order as well (see protocol at bottom of note).    CXR Findings:  XR CHEST PORTABLE    Result Date: 4/29/2024  Patchy linear consolidation in the left midlung similar to previous-atelectasis versus pneumonia. Low lung volumes with bibasilar atelectasis and trace left pleural effusion. Borderline cardiomegaly. Left arm PICC in standard position. Right jugular central venous catheter has been removed.      The findings from the last RT Protocol Assessment were as follows:  Smoking: Chronic pulmonary disease  Respiratory Pattern: Dyspnea on exertion or RR 21-25 bpm  Breath Sounds: Slightly diminished and/or crackles  Cough: Strong, productive  Indication for Bronchodilator Therapy: Decreased or absent breath sounds  Bronchodilator Assessment Score: 7    Aerosolized bronchodilator medication orders have been revised according to the RT Nebulizer Bronchodilator Protocol below.    Respiratory Therapist to perform RT Therapy Protocol Assessment initially then follow the protocol.  Repeat RT Therapy Protocol Assessment PRN for score 0-3 or on second treatment, BID, and PRN for scores above 3.    No Indications - adjust the frequency to every 6 hours PRN wheezing or bronchospasm, if no treatments needed after 48 hours then discontinue using Per Protocol order mode.     If indication present, adjust the RT bronchodilator orders based on the Bronchodilator Assessment Score as indicated below.  If a patient is on this medication at home then do not decrease Frequency below that used at home.    0-3 - enter or revise RT bronchodilator order(s) to equivalent RT Bronchodilator order with Frequency of every 4 hours PRN for wheezing or increased work of breathing using Per Protocol order mode.       4-6 - enter or revise RT

## 2024-04-30 NOTE — DISCHARGE INSTR - COC
pressure ulcer (Active)   Wound Etiology Pressure Stage 2 04/26/24 2039   Dressing Status Clean;Dry;Intact 04/27/24 0800   Wound Cleansed Soap and water 04/26/24 1634   Dressing/Treatment Foam;Silicone border 04/27/24 0800   Wound Assessment Pink/red;Dry 04/26/24 1634   Drainage Amount None (dry) 04/28/24 0800   Lionel-wound Assessment Blanchable erythema 04/26/24 1634   Number of days: 4       Wound 04/25/24 Abdomen Right;Upper Rash d/t poor hygiene (Active)   Wound Etiology Other 04/28/24 2000   Dressing Status Clean;Dry;Intact 04/28/24 0800   Wound Cleansed Soap and water 04/28/24 0800   Dressing/Treatment Moisture barrier;Interdry Ag/wicking fabric with Ag 04/28/24 2000   Dressing Change Due 04/29/24 04/28/24 0800   Wound Assessment Erythema 04/28/24 0800   Drainage Amount None (dry) 04/28/24 0800   Odor Malodorous/putrid 04/26/24 1634   Lionel-wound Assessment Excoriated 04/28/24 0800   Number of days: 4   Pt with full bath 04/30/2024 1325  mepilex applied to coccyx, AG wicking fabric applied under bilateral breast and under panis.  Moisture barrier cream applied to lionel area and new brief applied.      Elimination:  Continence:   Bowel: No  Bladder: No  Urinary Catheter: None   Colostomy/Ileostomy/Ileal Conduit: No       Date of Last BM: 04/30/2024  0930am    Intake/Output Summary (Last 24 hours) at 4/30/2024 1152  Last data filed at 4/30/2024 1000  Gross per 24 hour   Intake 1194.84 ml   Output 1625 ml   Net -430.16 ml     I/O last 3 completed shifts:  In: 1876.3 [P.O.:1200; I.V.:541.7; IV Piggyback:134.6]  Out: 1850 [Urine:1850]    Safety Concerns:     History of Falls (last 30 days)    Impairments/Disabilities:      None    Nutrition Therapy:  Current Nutrition Therapy:   - Oral Diet:  General    Routes of Feeding: Oral  Liquids: Thin Liquids  Daily Fluid Restriction: no  Last Modified Barium Swallow with Video (Video Swallowing Test): not done    Treatments at the Time of Hospital Discharge:   Respiratory

## 2024-04-30 NOTE — CARE COORDINATION
Discharge Note     Discharge order received.      Destination: Discharging to Facility/ Agency   Name: [facility]  Address: [address] [place] [id number]  Phone: [phone]  Fax: [phone]    Transportation:   [facility] [phone] Awarded 3:00pm EDT       All case management needs met.      Comment: Spoke with pt Son Trae and conveyed pt is discharging to [facility] today via ambulance. Called Erika at [facility] and conveyed there is a d/c order in for pt and that would be transported via ambulance @ 1500.

## 2024-05-03 LAB
EKG ATRIAL RATE: 61 BPM
EKG DIAGNOSIS: NORMAL
EKG P AXIS: 68 DEGREES
EKG P-R INTERVAL: 182 MS
EKG Q-T INTERVAL: 452 MS
EKG QRS DURATION: 78 MS
EKG QTC CALCULATION (BAZETT): 455 MS
EKG R AXIS: 113 DEGREES
EKG T AXIS: 65 DEGREES
EKG VENTRICULAR RATE: 61 BPM

## 2024-05-22 ENCOUNTER — TELEPHONE (OUTPATIENT)
Dept: CARDIOLOGY CLINIC | Age: 84
End: 2024-05-22

## 2024-05-22 NOTE — TELEPHONE ENCOUNTER
Returned call to Carmencita. Calling to notify us that Radha needs a cystoscopy with tumor removal. Need risk stratification. She has echo scheduled at [facility] the morning of her appt with [facility].

## 2024-05-22 NOTE — TELEPHONE ENCOUNTER
Mirian galeas, Carmencita from [facility] returned your call.  Please advise.  Thank you

## 2024-05-22 NOTE — TELEPHONE ENCOUNTER
GENNY Tse from [facility] is asking to speak with BRUCE Vela re: pt's upcoming appt on 5/30/24

## 2024-05-24 ENCOUNTER — OFFICE VISIT (OUTPATIENT)
Dept: VASCULAR SURGERY | Age: 84
End: 2024-05-24
Payer: MEDICARE

## 2024-05-24 VITALS — BODY MASS INDEX: 30.44 KG/M2 | HEART RATE: 84 BPM | HEIGHT: 58 IN | WEIGHT: 145 LBS

## 2024-05-24 DIAGNOSIS — I71.43 INFRARENAL ABDOMINAL AORTIC ANEURYSM (AAA) WITHOUT RUPTURE (HCC): Primary | ICD-10-CM

## 2024-05-24 PROCEDURE — 1123F ACP DISCUSS/DSCN MKR DOCD: CPT | Performed by: SURGERY

## 2024-05-24 PROCEDURE — 99204 OFFICE O/P NEW MOD 45 MIN: CPT | Performed by: SURGERY

## 2024-05-24 NOTE — PROGRESS NOTES
Radha Daley (:  1940) is a 83 y.o. female, here for evaluation of the following chief complaint(s):  New Patient (Ref 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 Daley is an 83-year-old female who presents to clinic today for evaluation of a 3-week centimeter infrarenal AAA.  Patient also referred for evaluation of severely tortuous aorta.  Concern per referral was that aneurysm is saccular in nature but per my review of the imaging this aneurysm appears to be fusiform.  Patient currently weak and wheelchair-bound due recent anemia.  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  Patient following outpatient with urology for discussion of treatment.  Overall patient denies any significant abdominal pain or low back pain out of the ordinary from her normal aches and pains.    Review of Systems   Constitutional: Negative.    HENT: Negative.     Eyes: Negative.    Respiratory:  Positive for shortness of breath.    Cardiovascular: Negative.    Gastrointestinal: Negative.    Endocrine: Negative.    Genitourinary: Negative.    Musculoskeletal: Negative.    Skin: Negative.    Allergic/Immunologic: Negative.    Neurological: Negative.    Hematological:  Bruises/bleeds easily.   Psychiatric/Behavioral: Negative.            Objective   Physical Exam  Vitals and nursing note reviewed.   Constitutional:       General: She is not in acute distress.     Appearance: Normal appearance.   HENT:      Head: Normocephalic and atraumatic.   Cardiovascular:      Rate and Rhythm: Normal rate and regular rhythm.   Pulmonary:      Effort: Pulmonary effort is normal.   Abdominal:      General: There is no distension.      Palpations: Abdomen is soft.      Tenderness: There is no abdominal tenderness.      Comments: No pulsatile

## 2024-05-30 ENCOUNTER — OFFICE VISIT (OUTPATIENT)
Age: 84
End: 2024-05-30
Payer: MEDICARE

## 2024-05-30 VITALS
HEART RATE: 85 BPM | WEIGHT: 149 LBS | OXYGEN SATURATION: 96 % | SYSTOLIC BLOOD PRESSURE: 116 MMHG | DIASTOLIC BLOOD PRESSURE: 62 MMHG | HEIGHT: 58 IN | BODY MASS INDEX: 31.28 KG/M2

## 2024-05-30 DIAGNOSIS — I27.20 PULMONARY HYPERTENSION (HCC): ICD-10-CM

## 2024-05-30 DIAGNOSIS — I48.91 ATRIAL FIBRILLATION, UNSPECIFIED TYPE (HCC): Primary | ICD-10-CM

## 2024-05-30 PROCEDURE — 1123F ACP DISCUSS/DSCN MKR DOCD: CPT | Performed by: INTERNAL MEDICINE

## 2024-05-30 PROCEDURE — 99214 OFFICE O/P EST MOD 30 MIN: CPT | Performed by: INTERNAL MEDICINE

## 2024-05-30 PROCEDURE — 93000 ELECTROCARDIOGRAM COMPLETE: CPT | Performed by: INTERNAL MEDICINE

## 2024-05-30 NOTE — PROGRESS NOTES
Social Determinants of Health     Financial Resource Strain: Not on file   Food Insecurity: No Food Insecurity (4/26/2024)    Hunger Vital Sign     Worried About Running Out of Food in the Last Year: Never true     Ran Out of Food in the Last Year: Never true   Transportation Needs: No Transportation Needs (4/26/2024)    PRAPARE - Transportation     Lack of Transportation (Medical): No     Lack of Transportation (Non-Medical): No   Physical Activity: Not on file   Stress: Not on file   Social Connections: Not on file   Intimate Partner Violence: Not on file   Housing Stability: Low Risk  (4/26/2024)    Housing Stability Vital Sign     Unable to Pay for Housing in the Last Year: No     Number of Places Lived in the Last Year: 1     Unstable Housing in the Last Year: No       Family History:   Family History   Problem Relation Age of Onset    Breast Cancer Mother     Breast Cancer Sister     Breast Cancer Niece      Family history has been reviewed and not pertinent except as noted above.     Review of Systems:   Constitutional: there has been no unanticipated weight loss. No change in energy or activity level   Eyes: No visual changes   ENT: No Headaches, hearing loss or vertigo. No mouth sores or sore throat.  Cardiovascular: Reviewed in HPI  Respiratory: No cough or wheezing, no sputum production.   Gastrointestinal: No abdominal pain, appetite loss, blood in stools. No change in bowel or bladder habits.  Genitourinary: No nocturia, dysuria, trouble voiding  Musculoskeletal:  No gait disturbance, weakness or joint complaints.  Integumentary: No rash or pruritis.  Neurological: No headache, change in muscle strength, numbness or tingling. No change in gait, balance, coordination, mood, affect, memory, mentation, behavior.  Psychiatric: No anxiety or depression  Endocrine: No malaise or fever  Hematologic/Lymphatic: No abnormal bruising or bleeding, blood clots or swollen lymph nodes.  Allergic/Immunologic: No 
*Pulmonic valve - trivial regurgitation   *Right ventricle - dilated and hypokinetic       SPECT none on file      Assessment/Plan  1. Atrial fibrillation, unspecified type (HCC)    2. Pulmonary hypertension (HCC)            Cardiac risk stratification   Plan for cystoscopy and transurethral resection of bladder tumor with general anesthesia  Can achieve >4 mets without symptoms   May proceed at low cardiac risk  Medication management ~ can hold eliquis for procedure    Afib  Current Rhythm NSR  Rate controlled yes  ChadsVasc score~ 4  Hx~ first noted while inpt 4/2024, s/p DCCV same admission   Meds~ amio / eliquis  Plan~ cont     Pulmonary Hypertension   RVSP 64mmHg  COPD untreated   Fu with pulm    Hypotension   Vitals:    05/30/24 1435   BP: 116/62   Pulse: 85   SpO2: 96%     Meds ~ midodrine   Plan ~ consider stopping    Hyperlipidemia  9/2022  TG 85 HDL 52 LDL 52  Meds ~ pravastatin   Plan ~ cont current med        Follow up 3 months with EP.    Orders Placed This Encounter   Procedures    EKG 12 lead         Alvaro Ness MD      Thank you for allowing to me to participate in the care of Radha Daley.     Scribe's Attestation: This note was scribed in the presence of Dr. Alvaro Ness MD by Mirian Manuel RN       I, Dr. Alvaro Ness, personally performed the services described in this documentation, as scribed by the above signed scribe in my presence. It is both accurate and complete to my knowledge. I agree with the details independently gathered by the clinical support staff, while the remaining scribed note accurately describes my personal service to the patient.

## 2024-06-12 ENCOUNTER — OFFICE VISIT (OUTPATIENT)
Dept: PULMONOLOGY | Age: 84
End: 2024-06-12
Payer: MEDICARE

## 2024-06-12 VITALS
DIASTOLIC BLOOD PRESSURE: 66 MMHG | OXYGEN SATURATION: 99 % | HEART RATE: 91 BPM | BODY MASS INDEX: 31.15 KG/M2 | HEIGHT: 58 IN | SYSTOLIC BLOOD PRESSURE: 102 MMHG

## 2024-06-12 DIAGNOSIS — I27.20 PULMONARY HYPERTENSION (HCC): ICD-10-CM

## 2024-06-12 DIAGNOSIS — R09.02 HYPOXEMIA: Primary | ICD-10-CM

## 2024-06-12 DIAGNOSIS — M41.25 OTHER IDIOPATHIC SCOLIOSIS, THORACOLUMBAR REGION: ICD-10-CM

## 2024-06-12 PROCEDURE — 99214 OFFICE O/P EST MOD 30 MIN: CPT | Performed by: INTERNAL MEDICINE

## 2024-06-12 PROCEDURE — 1123F ACP DISCUSS/DSCN MKR DOCD: CPT | Performed by: INTERNAL MEDICINE

## 2024-06-12 RX ORDER — ACETAMINOPHEN 325 MG/1
650 TABLET ORAL EVERY 6 HOURS PRN
COMMUNITY

## 2024-06-12 RX ORDER — LOPERAMIDE HYDROCHLORIDE 2 MG/1
2 CAPSULE ORAL 4 TIMES DAILY PRN
COMMUNITY

## 2024-06-12 NOTE — PROGRESS NOTES
Radha Daley (:  1940) is a 83 y.o. female,Established patient, here for evaluation of the following chief complaint(s):  Follow-Up from Hospital (COPD)      Assessment & Plan   1. Hypoxemia  2. Pulmonary hypertension (HCC)  3. Other idiopathic scoliosis, thoracolumbar region  Hypoxemia is not as severe as first observed during hospitalization.  The underlying problem appears to be a restrictive lung process that is most likely due to thoracolumbar scoliosis.  This is readily seen on CT imaging.  It is possible this is responsible for pulmonary hypertension, as suggested on echocardiogram.  Severity of pulmonary hypertension is not at all defined by this single study.  Improvement in hypoxemia may be a result of complete cessation of smoking, as she had been consuming about 1/2 pack/day prior to hospitalization in April.  Oxygen may be titrated, keeping O2 saturation greater than 88%.  Her oxygen requirement is certainly less than the 3 L currently being used, more likely 1 L or perhaps none.  While she does not have pulmonary function studies, she does not have clinical findings that would suggest significant chronic obstructive airflow disease.    Return if symptoms worsen or fail to improve.       Subjective   HPI   Radha Daley returns for follow-up, after hospitalization in April.  At that time she came in with weakness.  She was noted to have hypoxemia but there were no acute problems clearly identified that would have caused this.  She had atrial fibrillation.  Echocardiogram suggested pulmonary hypertension but no right heart dysfunction.  Clinically, she did not have heart failure.  She was discharged on oxygen 2 L/min.  She has been on rate control medication, and anticoagulation.  Since discharge, she has been a resident at SNF, and movement in leg weakness with physical therapy.  She was found to have anemia, hospitalized for acute blood loss but no source of bleeding was seen.

## 2024-11-06 NOTE — PROGRESS NOTES
[facility] [place]  [phone]      Patient: Radha Daley  YOB: 1940         Chief Complaint   Patient presents with    Follow-up     SOB with exertion, Pt denies cardiac symptoms at this time.        Referring provider: Nisreen Tong MD    History of Present Illness:   Radha Daley is a 84 y.o. female presenting in follow up.     Today she is here with use of a wheelchair from her LTC facility. She reports she has been doing well since last OV. Denies chest pain/pressure/squeezing/tightness, dizziness/lightheadedness, shortness of breath/dyspnea on exertion.      With regard to medication therapy he/she has been compliant with prescribed regimen and has tolerated therapy to date.    Past Medical History:   has a past medical history of Breast cancer (HCC) and History of therapeutic radiation.    Surgical History:   has no past surgical history on file.     Current Outpatient Medications   Medication Sig Dispense Refill    benzonatate (TESSALON) 100 MG capsule Take 1 capsule by mouth 3 times daily as needed for Cough      amiodarone (CORDARONE) 200 MG tablet Take 1 tablet by mouth 2 times daily 180 tablet 3    apixaban (ELIQUIS) 5 MG TABS tablet Take 1 tablet by mouth 2 times daily 180 tablet 3    pravastatin (PRAVACHOL) 40 MG tablet Take 1 tablet by mouth daily 90 tablet 3    midodrine (PROAMATINE) 5 MG tablet Take 1 tablet by mouth 3 times daily (with meals) 210 tablet 3    loperamide (IMODIUM) 2 MG capsule Take 1 capsule by mouth 4 times daily as needed for Diarrhea      OXYGEN Inhale 3 L into the lungs continuous Nasal canula      acetaminophen (TYLENOL) 325 MG tablet Take 2 tablets by mouth every 6 hours as needed for Pain      pantoprazole (PROTONIX) 40 MG tablet Take 1 tablet by mouth every morning (before breakfast) 30 tablet 3     No current facility-administered medications for this visit.       Allergies:  Patient has no known allergies.     Social

## 2024-11-07 ENCOUNTER — OFFICE VISIT (OUTPATIENT)
Age: 84
End: 2024-11-07

## 2024-11-07 VITALS
SYSTOLIC BLOOD PRESSURE: 126 MMHG | HEIGHT: 58 IN | DIASTOLIC BLOOD PRESSURE: 86 MMHG | BODY MASS INDEX: 31.14 KG/M2 | HEART RATE: 93 BPM | OXYGEN SATURATION: 94 %

## 2024-11-07 DIAGNOSIS — I48.91 ATRIAL FIBRILLATION, UNSPECIFIED TYPE (HCC): Primary | ICD-10-CM

## 2024-11-07 DIAGNOSIS — I27.20 PULMONARY HYPERTENSION (HCC): ICD-10-CM

## 2024-11-07 RX ORDER — AMIODARONE HYDROCHLORIDE 200 MG/1
200 TABLET ORAL 2 TIMES DAILY
Qty: 180 TABLET | Refills: 3 | Status: SHIPPED | OUTPATIENT
Start: 2024-11-07

## 2024-11-07 RX ORDER — PRAVASTATIN SODIUM 40 MG
40 TABLET ORAL DAILY
Qty: 90 TABLET | Refills: 3 | Status: SHIPPED | OUTPATIENT
Start: 2024-11-07

## 2024-11-07 RX ORDER — BENZONATATE 100 MG/1
100 CAPSULE ORAL 3 TIMES DAILY PRN
COMMUNITY

## 2024-11-07 RX ORDER — MIDODRINE HYDROCHLORIDE 5 MG/1
5 TABLET ORAL
Qty: 210 TABLET | Refills: 3 | Status: SHIPPED | OUTPATIENT
Start: 2024-11-07